# Patient Record
Sex: MALE | Race: WHITE | NOT HISPANIC OR LATINO | Employment: OTHER | URBAN - METROPOLITAN AREA
[De-identification: names, ages, dates, MRNs, and addresses within clinical notes are randomized per-mention and may not be internally consistent; named-entity substitution may affect disease eponyms.]

---

## 2018-07-22 ENCOUNTER — HOSPITAL ENCOUNTER (INPATIENT)
Facility: HOSPITAL | Age: 63
LOS: 3 days | DRG: 194 | End: 2018-07-26
Attending: EMERGENCY MEDICINE | Admitting: HOSPITALIST
Payer: MEDICARE

## 2018-07-22 DIAGNOSIS — J18.9 PNEUMONIA DUE TO INFECTIOUS ORGANISM, UNSPECIFIED LATERALITY, UNSPECIFIED PART OF LUNG: Primary | ICD-10-CM

## 2018-07-22 DIAGNOSIS — R50.9 ACUTE FEBRILE ILLNESS: ICD-10-CM

## 2018-07-22 DIAGNOSIS — I50.32 CHRONIC DIASTOLIC CONGESTIVE HEART FAILURE: Chronic | ICD-10-CM

## 2018-07-22 DIAGNOSIS — K57.92 DIVERTICULITIS: ICD-10-CM

## 2018-07-22 DIAGNOSIS — I50.9 CHF (CONGESTIVE HEART FAILURE): ICD-10-CM

## 2018-07-22 DIAGNOSIS — J44.1 CHRONIC OBSTRUCTIVE PULMONARY DISEASE WITH ACUTE EXACERBATION: Chronic | ICD-10-CM

## 2018-07-22 LAB
ALBUMIN SERPL BCP-MCNC: 2.8 G/DL
ALP SERPL-CCNC: 85 U/L
ALT SERPL W/O P-5'-P-CCNC: 37 U/L
ANION GAP SERPL CALC-SCNC: 10 MMOL/L
AST SERPL-CCNC: 51 U/L
BASOPHILS # BLD AUTO: 0.03 K/UL
BASOPHILS NFR BLD: 0.5 %
BILIRUB SERPL-MCNC: 0.8 MG/DL
BILIRUB UR QL STRIP: NEGATIVE
BUN SERPL-MCNC: 12 MG/DL
CALCIUM SERPL-MCNC: 8.8 MG/DL
CHLORIDE SERPL-SCNC: 102 MMOL/L
CLARITY UR: CLEAR
CO2 SERPL-SCNC: 26 MMOL/L
COLOR UR: YELLOW
CREAT SERPL-MCNC: 1.1 MG/DL
DIFFERENTIAL METHOD: ABNORMAL
EOSINOPHIL # BLD AUTO: 0.6 K/UL
EOSINOPHIL NFR BLD: 11.2 %
ERYTHROCYTE [DISTWIDTH] IN BLOOD BY AUTOMATED COUNT: 14.5 %
EST. GFR  (AFRICAN AMERICAN): >60 ML/MIN/1.73 M^2
EST. GFR  (NON AFRICAN AMERICAN): >60 ML/MIN/1.73 M^2
GLUCOSE SERPL-MCNC: 107 MG/DL
GLUCOSE UR QL STRIP: NEGATIVE
HCT VFR BLD AUTO: 43.4 %
HGB BLD-MCNC: 14.4 G/DL
HGB UR QL STRIP: NEGATIVE
KETONES UR QL STRIP: NEGATIVE
LEUKOCYTE ESTERASE UR QL STRIP: NEGATIVE
LIPASE SERPL-CCNC: 8 U/L
LYMPHOCYTES # BLD AUTO: 1.2 K/UL
LYMPHOCYTES NFR BLD: 20.7 %
MCH RBC QN AUTO: 33.2 PG
MCHC RBC AUTO-ENTMCNC: 33.2 G/DL
MCV RBC AUTO: 100 FL
MONOCYTES # BLD AUTO: 1.4 K/UL
MONOCYTES NFR BLD: 24.6 %
NEUTROPHILS # BLD AUTO: 2.4 K/UL
NEUTROPHILS NFR BLD: 41.9 %
NITRITE UR QL STRIP: NEGATIVE
PH UR STRIP: 6 [PH] (ref 5–8)
PLATELET # BLD AUTO: 190 K/UL
PMV BLD AUTO: 9.5 FL
POTASSIUM SERPL-SCNC: 3.5 MMOL/L
PROT SERPL-MCNC: 7 G/DL
PROT UR QL STRIP: NEGATIVE
RBC # BLD AUTO: 4.34 M/UL
SODIUM SERPL-SCNC: 138 MMOL/L
SP GR UR STRIP: 1.01 (ref 1–1.03)
URN SPEC COLLECT METH UR: NORMAL
UROBILINOGEN UR STRIP-ACNC: NEGATIVE EU/DL
WBC # BLD AUTO: 5.61 K/UL

## 2018-07-22 PROCEDURE — 85025 COMPLETE CBC W/AUTO DIFF WBC: CPT

## 2018-07-22 PROCEDURE — 25000003 PHARM REV CODE 250: Performed by: EMERGENCY MEDICINE

## 2018-07-22 PROCEDURE — 83036 HEMOGLOBIN GLYCOSYLATED A1C: CPT

## 2018-07-22 PROCEDURE — 25500020 PHARM REV CODE 255: Performed by: EMERGENCY MEDICINE

## 2018-07-22 PROCEDURE — 81003 URINALYSIS AUTO W/O SCOPE: CPT

## 2018-07-22 PROCEDURE — 80053 COMPREHEN METABOLIC PANEL: CPT

## 2018-07-22 PROCEDURE — 96361 HYDRATE IV INFUSION ADD-ON: CPT

## 2018-07-22 PROCEDURE — 99285 EMERGENCY DEPT VISIT HI MDM: CPT | Mod: 25

## 2018-07-22 PROCEDURE — 83690 ASSAY OF LIPASE: CPT

## 2018-07-22 RX ADMIN — SODIUM CHLORIDE 1000 ML: 0.9 INJECTION, SOLUTION INTRAVENOUS at 10:07

## 2018-07-22 RX ADMIN — IOHEXOL 100 ML: 350 INJECTION, SOLUTION INTRAVENOUS at 11:07

## 2018-07-23 PROBLEM — J18.9 PNEUMONIA DUE TO INFECTIOUS ORGANISM: Status: ACTIVE | Noted: 2018-07-23

## 2018-07-23 PROBLEM — J15.9 HEALTHCARE ASSOCIATED BACTERIAL PNEUMONIA: Status: ACTIVE | Noted: 2018-07-23

## 2018-07-23 PROBLEM — K21.9 GERD (GASTROESOPHAGEAL REFLUX DISEASE): Status: ACTIVE | Noted: 2018-07-23

## 2018-07-23 PROBLEM — J44.9 COPD (CHRONIC OBSTRUCTIVE PULMONARY DISEASE): Chronic | Status: ACTIVE | Noted: 2018-07-23

## 2018-07-23 PROBLEM — I50.9 CHF (CONGESTIVE HEART FAILURE): Status: ACTIVE | Noted: 2018-07-23

## 2018-07-23 PROBLEM — J44.9 COPD (CHRONIC OBSTRUCTIVE PULMONARY DISEASE): Status: ACTIVE | Noted: 2018-07-23

## 2018-07-23 LAB
ALBUMIN SERPL BCP-MCNC: 2.6 G/DL
ALP SERPL-CCNC: 77 U/L
ALT SERPL W/O P-5'-P-CCNC: 32 U/L
ANION GAP SERPL CALC-SCNC: 6 MMOL/L
AST SERPL-CCNC: 40 U/L
BASOPHILS # BLD AUTO: 0.02 K/UL
BASOPHILS NFR BLD: 0.4 %
BILIRUB SERPL-MCNC: 0.7 MG/DL
BUN SERPL-MCNC: 10 MG/DL
CALCIUM SERPL-MCNC: 8.1 MG/DL
CHLORIDE SERPL-SCNC: 103 MMOL/L
CO2 SERPL-SCNC: 29 MMOL/L
CREAT SERPL-MCNC: 1 MG/DL
DIFFERENTIAL METHOD: ABNORMAL
EOSINOPHIL # BLD AUTO: 0.6 K/UL
EOSINOPHIL NFR BLD: 11.6 %
ERYTHROCYTE [DISTWIDTH] IN BLOOD BY AUTOMATED COUNT: 14.7 %
EST. GFR  (AFRICAN AMERICAN): >60 ML/MIN/1.73 M^2
EST. GFR  (NON AFRICAN AMERICAN): >60 ML/MIN/1.73 M^2
ESTIMATED AVG GLUCOSE: 114 MG/DL
GLUCOSE SERPL-MCNC: 102 MG/DL
HBA1C MFR BLD HPLC: 5.6 %
HCT VFR BLD AUTO: 40.2 %
HGB BLD-MCNC: 13.2 G/DL
LYMPHOCYTES # BLD AUTO: 1 K/UL
LYMPHOCYTES NFR BLD: 18.9 %
MCH RBC QN AUTO: 33.2 PG
MCHC RBC AUTO-ENTMCNC: 32.8 G/DL
MCV RBC AUTO: 101 FL
MONOCYTES # BLD AUTO: 1.3 K/UL
MONOCYTES NFR BLD: 25.4 %
NEUTROPHILS # BLD AUTO: 2.2 K/UL
NEUTROPHILS NFR BLD: 42.5 %
PLATELET # BLD AUTO: 191 K/UL
PMV BLD AUTO: 9.4 FL
POTASSIUM SERPL-SCNC: 3.4 MMOL/L
PROT SERPL-MCNC: 6.3 G/DL
RBC # BLD AUTO: 3.97 M/UL
SODIUM SERPL-SCNC: 138 MMOL/L
WBC # BLD AUTO: 5.19 K/UL

## 2018-07-23 PROCEDURE — 63600175 PHARM REV CODE 636 W HCPCS: Performed by: PHYSICIAN ASSISTANT

## 2018-07-23 PROCEDURE — 80053 COMPREHEN METABOLIC PANEL: CPT

## 2018-07-23 PROCEDURE — 94644 CONT INHLJ TX 1ST HOUR: CPT

## 2018-07-23 PROCEDURE — 63600175 PHARM REV CODE 636 W HCPCS: Performed by: EMERGENCY MEDICINE

## 2018-07-23 PROCEDURE — 96367 TX/PROPH/DG ADDL SEQ IV INF: CPT

## 2018-07-23 PROCEDURE — 25000003 PHARM REV CODE 250: Performed by: EMERGENCY MEDICINE

## 2018-07-23 PROCEDURE — 85025 COMPLETE CBC W/AUTO DIFF WBC: CPT

## 2018-07-23 PROCEDURE — 96366 THER/PROPH/DIAG IV INF ADDON: CPT

## 2018-07-23 PROCEDURE — 96365 THER/PROPH/DIAG IV INF INIT: CPT

## 2018-07-23 PROCEDURE — 94640 AIRWAY INHALATION TREATMENT: CPT

## 2018-07-23 PROCEDURE — 87040 BLOOD CULTURE FOR BACTERIA: CPT | Mod: 59

## 2018-07-23 PROCEDURE — 94761 N-INVAS EAR/PLS OXIMETRY MLT: CPT

## 2018-07-23 PROCEDURE — 25000003 PHARM REV CODE 250: Performed by: PHYSICIAN ASSISTANT

## 2018-07-23 PROCEDURE — C8929 TTE W OR WO FOL WCON,DOPPLER: HCPCS

## 2018-07-23 PROCEDURE — 93306 TTE W/DOPPLER COMPLETE: CPT | Mod: 26,,, | Performed by: INTERNAL MEDICINE

## 2018-07-23 PROCEDURE — 25000242 PHARM REV CODE 250 ALT 637 W/ HCPCS: Performed by: PHYSICIAN ASSISTANT

## 2018-07-23 PROCEDURE — C9113 INJ PANTOPRAZOLE SODIUM, VIA: HCPCS | Performed by: EMERGENCY MEDICINE

## 2018-07-23 PROCEDURE — 11000001 HC ACUTE MED/SURG PRIVATE ROOM

## 2018-07-23 PROCEDURE — 25000242 PHARM REV CODE 250 ALT 637 W/ HCPCS

## 2018-07-23 PROCEDURE — 27000221 HC OXYGEN, UP TO 24 HOURS

## 2018-07-23 RX ORDER — L. ACIDOPHILUS/L.BULGARICUS 100MM CELL
1 GRANULES IN PACKET (EA) ORAL 2 TIMES DAILY
COMMUNITY

## 2018-07-23 RX ORDER — ALBUTEROL SULFATE 2.5 MG/.5ML
15 SOLUTION RESPIRATORY (INHALATION) ONCE
Status: COMPLETED | OUTPATIENT
Start: 2018-07-23 | End: 2018-07-23

## 2018-07-23 RX ORDER — TRAZODONE HYDROCHLORIDE 100 MG/1
75 TABLET ORAL NIGHTLY
COMMUNITY

## 2018-07-23 RX ORDER — IBUPROFEN 400 MG/1
400 TABLET ORAL EVERY 6 HOURS PRN
Status: DISCONTINUED | OUTPATIENT
Start: 2018-07-23 | End: 2018-07-26 | Stop reason: HOSPADM

## 2018-07-23 RX ORDER — FOLIC ACID 1 MG/1
1 TABLET ORAL DAILY
Status: DISCONTINUED | OUTPATIENT
Start: 2018-07-23 | End: 2018-07-26 | Stop reason: HOSPADM

## 2018-07-23 RX ORDER — PANTOPRAZOLE SODIUM 40 MG/1
40 TABLET, DELAYED RELEASE ORAL DAILY
COMMUNITY

## 2018-07-23 RX ORDER — GUAIFENESIN 600 MG/1
600 TABLET, EXTENDED RELEASE ORAL 2 TIMES DAILY
Status: DISCONTINUED | OUTPATIENT
Start: 2018-07-23 | End: 2018-07-26 | Stop reason: HOSPADM

## 2018-07-23 RX ORDER — LORATADINE 10 MG/1
10 TABLET ORAL DAILY
COMMUNITY

## 2018-07-23 RX ORDER — FUROSEMIDE 40 MG/1
40 TABLET ORAL 2 TIMES DAILY
Status: ON HOLD | COMMUNITY
End: 2018-07-25

## 2018-07-23 RX ORDER — FUROSEMIDE 40 MG/1
40 TABLET ORAL 2 TIMES DAILY
Status: DISCONTINUED | OUTPATIENT
Start: 2018-07-23 | End: 2018-07-23

## 2018-07-23 RX ORDER — IPRATROPIUM BROMIDE AND ALBUTEROL SULFATE 2.5; .5 MG/3ML; MG/3ML
3 SOLUTION RESPIRATORY (INHALATION) EVERY 4 HOURS
Status: DISCONTINUED | OUTPATIENT
Start: 2018-07-23 | End: 2018-07-26 | Stop reason: HOSPADM

## 2018-07-23 RX ORDER — CETIRIZINE HYDROCHLORIDE 5 MG/1
10 TABLET ORAL DAILY
Status: DISCONTINUED | OUTPATIENT
Start: 2018-07-23 | End: 2018-07-26 | Stop reason: HOSPADM

## 2018-07-23 RX ORDER — POTASSIUM CHLORIDE 20 MEQ/1
20 TABLET, EXTENDED RELEASE ORAL 2 TIMES DAILY
Status: DISCONTINUED | OUTPATIENT
Start: 2018-07-23 | End: 2018-07-24

## 2018-07-23 RX ORDER — FUROSEMIDE 10 MG/ML
60 INJECTION INTRAMUSCULAR; INTRAVENOUS 2 TIMES DAILY
Status: DISCONTINUED | OUTPATIENT
Start: 2018-07-23 | End: 2018-07-23

## 2018-07-23 RX ORDER — METHYLPREDNISOLONE SOD SUCC 125 MG
125 VIAL (EA) INJECTION EVERY 12 HOURS
Status: DISCONTINUED | OUTPATIENT
Start: 2018-07-23 | End: 2018-07-23

## 2018-07-23 RX ORDER — VANCOMYCIN HCL IN 5 % DEXTROSE 1G/250ML
1000 PLASTIC BAG, INJECTION (ML) INTRAVENOUS
Status: DISCONTINUED | OUTPATIENT
Start: 2018-07-23 | End: 2018-07-26

## 2018-07-23 RX ORDER — SODIUM CHLORIDE 9 MG/ML
INJECTION, SOLUTION INTRAVENOUS CONTINUOUS
Status: DISCONTINUED | OUTPATIENT
Start: 2018-07-23 | End: 2018-07-23

## 2018-07-23 RX ORDER — GUAIFENESIN 600 MG/1
600 TABLET, EXTENDED RELEASE ORAL 2 TIMES DAILY
COMMUNITY

## 2018-07-23 RX ORDER — ENOXAPARIN SODIUM 100 MG/ML
40 INJECTION SUBCUTANEOUS
COMMUNITY

## 2018-07-23 RX ORDER — THIAMINE HCL 100 MG
100 TABLET ORAL DAILY
Status: DISCONTINUED | OUTPATIENT
Start: 2018-07-23 | End: 2018-07-26 | Stop reason: HOSPADM

## 2018-07-23 RX ORDER — PANTOPRAZOLE SODIUM 40 MG/1
40 TABLET, DELAYED RELEASE ORAL DAILY
Status: DISCONTINUED | OUTPATIENT
Start: 2018-07-24 | End: 2018-07-26 | Stop reason: HOSPADM

## 2018-07-23 RX ORDER — FUROSEMIDE 10 MG/ML
60 INJECTION INTRAMUSCULAR; INTRAVENOUS 2 TIMES DAILY
Status: DISCONTINUED | OUTPATIENT
Start: 2018-07-23 | End: 2018-07-24

## 2018-07-23 RX ORDER — VANCOMYCIN HCL IN 5 % DEXTROSE 1G/250ML
1000 PLASTIC BAG, INJECTION (ML) INTRAVENOUS
Status: COMPLETED | OUTPATIENT
Start: 2018-07-23 | End: 2018-07-23

## 2018-07-23 RX ORDER — L. ACIDOPHILUS/L.BULGARICUS 100MM CELL
1 GRANULES IN PACKET (EA) ORAL 2 TIMES DAILY
Status: DISCONTINUED | OUTPATIENT
Start: 2018-07-23 | End: 2018-07-26 | Stop reason: HOSPADM

## 2018-07-23 RX ORDER — ALBUTEROL SULFATE 2.5 MG/.5ML
2.5 SOLUTION RESPIRATORY (INHALATION) EVERY 4 HOURS PRN
Status: DISCONTINUED | OUTPATIENT
Start: 2018-07-23 | End: 2018-07-23

## 2018-07-23 RX ORDER — POTASSIUM CHLORIDE 750 MG/1
20 TABLET, EXTENDED RELEASE ORAL 2 TIMES DAILY
Status: ON HOLD | COMMUNITY
End: 2018-07-25 | Stop reason: HOSPADM

## 2018-07-23 RX ORDER — ALBUTEROL SULFATE 2.5 MG/.5ML
SOLUTION RESPIRATORY (INHALATION)
Status: COMPLETED
Start: 2018-07-23 | End: 2018-07-23

## 2018-07-23 RX ORDER — FOLIC ACID 1 MG/1
1 TABLET ORAL DAILY
COMMUNITY

## 2018-07-23 RX ORDER — ESCITALOPRAM OXALATE 10 MG/1
15 TABLET ORAL DAILY
COMMUNITY

## 2018-07-23 RX ORDER — DOCUSATE SODIUM 100 MG/1
100 CAPSULE, LIQUID FILLED ORAL 2 TIMES DAILY
Status: DISCONTINUED | OUTPATIENT
Start: 2018-07-23 | End: 2018-07-23

## 2018-07-23 RX ORDER — IPRATROPIUM BROMIDE AND ALBUTEROL SULFATE 2.5; .5 MG/3ML; MG/3ML
3 SOLUTION RESPIRATORY (INHALATION) ONCE
Status: DISCONTINUED | OUTPATIENT
Start: 2018-07-23 | End: 2018-07-23

## 2018-07-23 RX ORDER — METHYLPREDNISOLONE SOD SUCC 125 MG
125 VIAL (EA) INJECTION EVERY 12 HOURS
Status: DISCONTINUED | OUTPATIENT
Start: 2018-07-23 | End: 2018-07-24

## 2018-07-23 RX ADMIN — LACTOBACILLUS ACIDOPHILUS / LACTOBACILLUS BULGARICUS 1 EACH: 100 MILLION CFU STRENGTH GRANULES at 10:07

## 2018-07-23 RX ADMIN — FUROSEMIDE 60 MG: 10 INJECTION, SOLUTION INTRAMUSCULAR; INTRAVENOUS at 12:07

## 2018-07-23 RX ADMIN — LACTOBACILLUS ACIDOPHILUS / LACTOBACILLUS BULGARICUS 1 EACH: 100 MILLION CFU STRENGTH GRANULES at 11:07

## 2018-07-23 RX ADMIN — TRAZODONE HYDROCHLORIDE 75 MG: 50 TABLET ORAL at 11:07

## 2018-07-23 RX ADMIN — PIPERACILLIN SODIUM AND TAZOBACTAM SODIUM 4.5 G: 4; .5 INJECTION, POWDER, FOR SOLUTION INTRAVENOUS at 02:07

## 2018-07-23 RX ADMIN — PIPERACILLIN SODIUM AND TAZOBACTAM SODIUM 4.5 G: 4; .5 INJECTION, POWDER, LYOPHILIZED, FOR SOLUTION INTRAVENOUS at 01:07

## 2018-07-23 RX ADMIN — IPRATROPIUM BROMIDE AND ALBUTEROL SULFATE 3 ML: .5; 3 SOLUTION RESPIRATORY (INHALATION) at 11:07

## 2018-07-23 RX ADMIN — IPRATROPIUM BROMIDE AND ALBUTEROL SULFATE 3 ML: .5; 3 SOLUTION RESPIRATORY (INHALATION) at 04:07

## 2018-07-23 RX ADMIN — ESCITALOPRAM 15 MG: 10 TABLET, FILM COATED ORAL at 11:07

## 2018-07-23 RX ADMIN — POTASSIUM CHLORIDE 20 MEQ: 1500 TABLET, EXTENDED RELEASE ORAL at 10:07

## 2018-07-23 RX ADMIN — VANCOMYCIN HYDROCHLORIDE 500 MG: 1 INJECTION, POWDER, LYOPHILIZED, FOR SOLUTION INTRAVENOUS at 04:07

## 2018-07-23 RX ADMIN — FOLIC ACID 1 MG: 1 TABLET ORAL at 11:07

## 2018-07-23 RX ADMIN — VANCOMYCIN HYDROCHLORIDE 1000 MG: 1 INJECTION, POWDER, LYOPHILIZED, FOR SOLUTION INTRAVENOUS at 05:07

## 2018-07-23 RX ADMIN — VANCOMYCIN HYDROCHLORIDE 1000 MG: 1 INJECTION, POWDER, LYOPHILIZED, FOR SOLUTION INTRAVENOUS at 02:07

## 2018-07-23 RX ADMIN — SODIUM CHLORIDE: 0.9 INJECTION, SOLUTION INTRAVENOUS at 03:07

## 2018-07-23 RX ADMIN — Medication 100 MG: at 11:07

## 2018-07-23 RX ADMIN — GUAIFENESIN 600 MG: 600 TABLET, EXTENDED RELEASE ORAL at 12:07

## 2018-07-23 RX ADMIN — POTASSIUM CHLORIDE 20 MEQ: 1500 TABLET, EXTENDED RELEASE ORAL at 11:07

## 2018-07-23 RX ADMIN — DOCUSATE SODIUM 100 MG: 100 CAPSULE, LIQUID FILLED ORAL at 11:07

## 2018-07-23 RX ADMIN — CETIRIZINE HYDROCHLORIDE 10 MG: 5 TABLET, FILM COATED ORAL at 11:07

## 2018-07-23 RX ADMIN — ALBUTEROL SULFATE 2.5 MG: 2.5 SOLUTION RESPIRATORY (INHALATION) at 05:07

## 2018-07-23 RX ADMIN — PIPERACILLIN SODIUM AND TAZOBACTAM SODIUM 4.5 G: 4; .5 INJECTION, POWDER, FOR SOLUTION INTRAVENOUS at 11:07

## 2018-07-23 RX ADMIN — GUAIFENESIN 600 MG: 600 TABLET, EXTENDED RELEASE ORAL at 10:07

## 2018-07-23 RX ADMIN — DEXTROSE 40 MG: 50 INJECTION, SOLUTION INTRAVENOUS at 10:07

## 2018-07-23 RX ADMIN — IPRATROPIUM BROMIDE AND ALBUTEROL SULFATE 3 ML: .5; 3 SOLUTION RESPIRATORY (INHALATION) at 08:07

## 2018-07-23 RX ADMIN — METHYLPREDNISOLONE SODIUM SUCCINATE 125 MG: 125 INJECTION, POWDER, FOR SOLUTION INTRAMUSCULAR; INTRAVENOUS at 05:07

## 2018-07-23 NOTE — ASSESSMENT & PLAN NOTE
Abdominal pain and tenderness present. CT showing stranding in LLQ.   CLD. Vanc and zosyn. Pain control. IVF as needed.

## 2018-07-23 NOTE — ED NOTES
Pt pulled out IV, pt up out of bed. Pt redirected back in bed and moved to a room closer to nurses station.

## 2018-07-23 NOTE — NURSING
Tiffanie Lewis notified of pt blood pressure of 95/51 with shortness of breath notified while laying in bed. Debbie stated she would come up tp the floor to see patient

## 2018-07-23 NOTE — PLAN OF CARE
Problem: Patient Care Overview  Goal: Plan of Care Review  Outcome: Ongoing (interventions implemented as appropriate)  Patient on oxygen with documented flow. Will attempt to wean per protocol.

## 2018-07-23 NOTE — ED TRIAGE NOTES
Patient presents to ED from [facility] by [facility] with c/o right lower abdominal pain for a few days. Pt is a voluntary admit to [facility] for depression. Pt also c/o nausea. Denies vomiting.

## 2018-07-23 NOTE — SUBJECTIVE & OBJECTIVE
Past Medical History:   Diagnosis Date    Abdominal hernia     Depression     Diverticulitis     History of psychiatric hospitalization     Hx of psychiatric care     Psychiatric problem     Suicide attempt     Therapy        Past Surgical History:   Procedure Laterality Date    COLON SURGERY      STOMACH SURGERY         Review of patient's allergies indicates:  No Known Allergies    No current facility-administered medications on file prior to encounter.      Current Outpatient Prescriptions on File Prior to Encounter   Medication Sig    docusate sodium (COLACE) 100 MG capsule Take 1 capsule (100 mg total) by mouth 3 (three) times daily as needed for Constipation. (Patient taking differently: Take 100 mg by mouth 2 (two) times daily. )    thiamine 100 MG tablet Take 1 tablet (100 mg total) by mouth once daily.    mirtazapine (REMERON) 45 MG tablet Take 1 tablet (45 mg total) by mouth every evening.    [DISCONTINUED] etodolac (LODINE) 400 MG tablet Take 1 tablet (400 mg total) by mouth 2 (two) times daily.    [DISCONTINUED] folic acid-vit B6-vit B12 2.5-25-2 mg (FOLBIC OR EQUIV) 2.5-25-2 mg Tab Take 1 tablet by mouth once daily.    [DISCONTINUED] gabapentin (NEURONTIN) 100 MG capsule Take 100 mg by mouth 3 (three) times daily.     Family History     Problem Relation (Age of Onset)    No Known Problems Mother, Father        Social History Main Topics    Smoking status: Current Every Day Smoker     Packs/day: 2.00     Years: 20.00     Types: Cigarettes    Smokeless tobacco: Never Used    Alcohol use 100.8 oz/week     168 Cans of beer per week      Comment: daily    Drug use: No    Sexual activity: Yes     Partners: Female     Review of Systems   Unable to perform ROS: Psychiatric disorder     Objective:     Vital Signs (Most Recent):  Temp: 100.3 °F (37.9 °C) (07/23/18 1244)  Pulse: 88 (07/23/18 1244)  Resp: (!) 22 (07/23/18 1141)  BP: 124/66 (07/23/18 1244)  SpO2: (!) 94 % (07/23/18 1141) Vital  Signs (24h Range):  Temp:  [96.4 °F (35.8 °C)-100.3 °F (37.9 °C)] 100.3 °F (37.9 °C)  Pulse:  [70-99] 88  Resp:  [20-30] 22  SpO2:  [94 %-100 %] 94 %  BP: ()/(39-88) 124/66     Weight: 93 kg (205 lb 0.4 oz)  Body mass index is 36.32 kg/m².    Physical Exam   Constitutional: He appears well-developed and well-nourished.   HENT:   Head: Normocephalic and atraumatic.   Neck: Normal range of motion.   Cardiovascular: Normal rate and regular rhythm.    Pulmonary/Chest: Tachypnea noted. He has wheezes. He has rales.   Musculoskeletal: He exhibits edema.   Neurological: He is alert.   Skin: Skin is warm and dry.   Psychiatric: His affect is blunt.           Significant Labs:   CBC:   Recent Labs  Lab 07/22/18 2309 07/23/18  0517   WBC 5.61 5.19   HGB 14.4 13.2*   HCT 43.4 40.2    191     CMP:   Recent Labs  Lab 07/22/18  2309 07/23/18  0517    138   K 3.5 3.4*    103   CO2 26 29    102   BUN 12 10   CREATININE 1.1 1.0   CALCIUM 8.8 8.1*   PROT 7.0 6.3   ALBUMIN 2.8* 2.6*   BILITOT 0.8 0.7   ALKPHOS 85 77   AST 51* 40   ALT 37 32   ANIONGAP 10 6*   EGFRNONAA >60 >60       Significant Imaging: I have reviewed all pertinent imaging results/findings within the past 24 hours.

## 2018-07-23 NOTE — ASSESSMENT & PLAN NOTE
Pt is voluntary admit at [facility] for depression and alcohol use.  Ativan PRN. Continue home lexapro, trazodone.

## 2018-07-23 NOTE — PLAN OF CARE
"TN met with pt -   pt able to answer questions with encouragement.    pt reports he resides at [facility] in [place] -- lived there prior to transfer to [facility] in [place]   TN called and confirmed wit Nurse Ange at [facility] - that pt is a resident and still has a bed.      TN asked pt if he wanted to return to [facility] at d/c - stated "oh, I guess so".       Pt states he is orignally from [place] -- no relatives in this area.  Is not in regular contact with his family.         07/23/18 1645   Discharge Reassessment   Assessment Type Discharge Planning Reassessment   Provided patient/caregiver education on the expected discharge date and the discharge plan Yes   Do you have any problems affording any of your prescribed medications? TBD   Discharge Plan A Psychiatric hospital  ([facility] )   Discharge Plan B Return to Nursing Home  ([facility] )   Patient choice form signed by patient/caregiver N/A   Can the patient answer the patient profile reliably? Yes, cognitively intact  (with encouragement - pt answer questions   )     "

## 2018-07-23 NOTE — ASSESSMENT & PLAN NOTE
Tobacco abuse  COPD (chronic obstructive pulmonary disease)  CHF (congestive heart failure)  Pt with SOB and hypoxia on room air. CXR showing coarse interstitial lung changes. CT chest showing some consolidation in the LLL.   Give vanc and zosyn. Duonebs q4h. Mucinex. Supplemental O2, wean as tolerated. Smoking cessation counseling. Lasix IV BID.

## 2018-07-23 NOTE — H&P
"[facility] Hospital Medicine  History & Physical    Patient Name: Nando Morrison  MRN: [id number]  Admission Date: 7/22/2018  Attending Physician: Tiffanie Lewis PA-C  Primary Care Provider: Primary Doctor No         Patient information was obtained from patient, nursing home, past medical records and ER records.     Subjective:     Principal Problem:Healthcare associated bacterial pneumonia    Chief Complaint:   Chief Complaint   Patient presents with    Abdominal Pain     x3 days; accompanied by fever and nausea; [facility]- voluntary admittance secondary to depression; last dose of tylenol given a "few hours ago" per EMS        HPI: Mr. Morrison is a 62 yo white male with severe depression, history of alcohol abuse, recurrent diverticulitis, COPD, GERD, and CHF who presents today with SOB and abdominal pain. He is a resident of [facility] and is a voluntary admit d/t depression and alcohol use. History is limited by pt's minimal expression. He reports that his stomach is hurting and he has trouble breathing. He was sent to the ED by the NH when his temp was found to be 101.6 and his mental status was altered. In the ED, CT abdomen showed diverticulitis. CXR showed coarse interstitial attenuation likely related to mild interstitial edema. He was started on vanc and zosyn. He was admitted to hospital medicine.     Past Medical History:   Diagnosis Date    Abdominal hernia     Depression     Diverticulitis     History of psychiatric hospitalization     Hx of psychiatric care     Psychiatric problem     Suicide attempt     Therapy        Past Surgical History:   Procedure Laterality Date    COLON SURGERY      STOMACH SURGERY         Review of patient's allergies indicates:  No Known Allergies    No current facility-administered medications on file prior to encounter.      Current Outpatient Prescriptions on File Prior to Encounter   Medication Sig    docusate " sodium (COLACE) 100 MG capsule Take 1 capsule (100 mg total) by mouth 3 (three) times daily as needed for Constipation. (Patient taking differently: Take 100 mg by mouth 2 (two) times daily. )    thiamine 100 MG tablet Take 1 tablet (100 mg total) by mouth once daily.    mirtazapine (REMERON) 45 MG tablet Take 1 tablet (45 mg total) by mouth every evening.    [DISCONTINUED] etodolac (LODINE) 400 MG tablet Take 1 tablet (400 mg total) by mouth 2 (two) times daily.    [DISCONTINUED] folic acid-vit B6-vit B12 2.5-25-2 mg (FOLBIC OR EQUIV) 2.5-25-2 mg Tab Take 1 tablet by mouth once daily.    [DISCONTINUED] gabapentin (NEURONTIN) 100 MG capsule Take 100 mg by mouth 3 (three) times daily.     Family History     Problem Relation (Age of Onset)    No Known Problems Mother, Father        Social History Main Topics    Smoking status: Current Every Day Smoker     Packs/day: 2.00     Years: 20.00     Types: Cigarettes    Smokeless tobacco: Never Used    Alcohol use 100.8 oz/week     168 Cans of beer per week      Comment: daily    Drug use: No    Sexual activity: Yes     Partners: Female     Review of Systems   Unable to perform ROS: Psychiatric disorder     Objective:     Vital Signs (Most Recent):  Temp: 100.3 °F (37.9 °C) (07/23/18 1244)  Pulse: 88 (07/23/18 1244)  Resp: (!) 22 (07/23/18 1141)  BP: 124/66 (07/23/18 1244)  SpO2: (!) 94 % (07/23/18 1141) Vital Signs (24h Range):  Temp:  [96.4 °F (35.8 °C)-100.3 °F (37.9 °C)] 100.3 °F (37.9 °C)  Pulse:  [70-99] 88  Resp:  [20-30] 22  SpO2:  [94 %-100 %] 94 %  BP: ()/(39-88) 124/66     Weight: 93 kg (205 lb 0.4 oz)  Body mass index is 36.32 kg/m².    Physical Exam   Constitutional: He appears well-developed and well-nourished.   HENT:   Head: Normocephalic and atraumatic.   Neck: Normal range of motion.   Cardiovascular: Normal rate and regular rhythm.    Pulmonary/Chest: Tachypnea noted. He has wheezes. He has rales.   Musculoskeletal: He exhibits edema.    Neurological: He is alert.   Skin: Skin is warm and dry.   Psychiatric: His affect is blunt.           Significant Labs:   CBC:   Recent Labs  Lab 07/22/18  2309 07/23/18  0517   WBC 5.61 5.19   HGB 14.4 13.2*   HCT 43.4 40.2    191     CMP:   Recent Labs  Lab 07/22/18  2309 07/23/18  0517    138   K 3.5 3.4*    103   CO2 26 29    102   BUN 12 10   CREATININE 1.1 1.0   CALCIUM 8.8 8.1*   PROT 7.0 6.3   ALBUMIN 2.8* 2.6*   BILITOT 0.8 0.7   ALKPHOS 85 77   AST 51* 40   ALT 37 32   ANIONGAP 10 6*   EGFRNONAA >60 >60       Significant Imaging: I have reviewed all pertinent imaging results/findings within the past 24 hours.    Assessment/Plan:     * Healthcare associated bacterial pneumonia    Tobacco abuse  COPD (chronic obstructive pulmonary disease)  CHF (congestive heart failure)  Pt with SOB and hypoxia on room air. CXR showing coarse interstitial lung changes. CT chest showing some consolidation in the LLL.   Give vanc and zosyn. Duonebs q4h. Mucinex. Supplemental O2, wean as tolerated. Smoking cessation counseling. Lasix IV BID.        Diverticulitis    Abdominal pain and tenderness present. CT showing stranding in LLQ.   CLD. Vanc and zosyn. Pain control. IVF as needed.        Depression    Pt is voluntary admit at [facility] for depression and alcohol use.  Ativan PRN. Continue home lexapro, trazodone.        GERD (gastroesophageal reflux disease)    Continue home protonix.           VTE Risk Mitigation         Ordered     IP VTE HIGH RISK PATIENT  Once      07/23/18 0203     Place ARELIS hose  Until discontinued      07/23/18 0203             Tiffanie Lewis PA-C  Department of Hospital Medicine   [facility]

## 2018-07-23 NOTE — HPI
Mr. Morrison is a 64 yo male with severe depression, history of alcohol abuse, recurrent diverticulitis, COPD, GERD, and diastolic CHF who presented to [facility] ED on 7/22/2018 with SOB and abdominal pain. He currently resides at [facility] voluntarily d/t depression and alcohol use. History is limited by pt's minimal expression. He reports that his stomach is hurting and he has trouble breathing. He was sent to the ED by the NH when his temp was found to be 101.6 and his mental status was altered. In the ED, CT abdomen showed diverticulitis. CXR showed coarse interstitial attenuation likely related to mild interstitial edema. He was started on vanc and zosyn. He was admitted to hospital medicine.

## 2018-07-23 NOTE — PLAN OF CARE
Problem: Patient Care Overview  Goal: Plan of Care Review  Outcome: Ongoing (interventions implemented as appropriate)  Pt awake and alert. Shortness of breath while sitting in bed. 2L NC with O2 sat above 93% Respiratory treatment every 4 hours. OOB to bathroom with walker and assistance. Voiding post lasix given.

## 2018-07-24 PROBLEM — R79.89 ELEVATED LACTIC ACID LEVEL: Status: ACTIVE | Noted: 2018-07-24

## 2018-07-24 PROBLEM — I50.32 CHRONIC DIASTOLIC CONGESTIVE HEART FAILURE: Status: ACTIVE | Noted: 2018-07-23

## 2018-07-24 PROBLEM — I50.32 CHRONIC DIASTOLIC CONGESTIVE HEART FAILURE: Chronic | Status: ACTIVE | Noted: 2018-07-23

## 2018-07-24 LAB
ALBUMIN SERPL BCP-MCNC: 2.9 G/DL
ALP SERPL-CCNC: 84 U/L
ALT SERPL W/O P-5'-P-CCNC: 36 U/L
ANION GAP SERPL CALC-SCNC: 12 MMOL/L
AST SERPL-CCNC: 40 U/L
BASOPHILS # BLD AUTO: 0.01 K/UL
BASOPHILS NFR BLD: 0.3 %
BILIRUB SERPL-MCNC: 0.5 MG/DL
BUN SERPL-MCNC: 12 MG/DL
CALCIUM SERPL-MCNC: 8.6 MG/DL
CHLORIDE SERPL-SCNC: 100 MMOL/L
CO2 SERPL-SCNC: 26 MMOL/L
CREAT SERPL-MCNC: 1.3 MG/DL
DIASTOLIC DYSFUNCTION: YES
DIFFERENTIAL METHOD: ABNORMAL
EOSINOPHIL # BLD AUTO: 0 K/UL
EOSINOPHIL NFR BLD: 0 %
ERYTHROCYTE [DISTWIDTH] IN BLOOD BY AUTOMATED COUNT: 14.5 %
EST. GFR  (AFRICAN AMERICAN): >60 ML/MIN/1.73 M^2
EST. GFR  (NON AFRICAN AMERICAN): 58 ML/MIN/1.73 M^2
ESTIMATED PA SYSTOLIC PRESSURE: 17.31
GLUCOSE SERPL-MCNC: 243 MG/DL
HCT VFR BLD AUTO: 42.8 %
HGB BLD-MCNC: 13.7 G/DL
LACTATE SERPL-SCNC: 3.3 MMOL/L
LACTATE SERPL-SCNC: 3.6 MMOL/L
LACTATE SERPL-SCNC: 4 MMOL/L
LACTATE SERPL-SCNC: 4.2 MMOL/L
LYMPHOCYTES # BLD AUTO: 0.5 K/UL
LYMPHOCYTES NFR BLD: 15.5 %
MCH RBC QN AUTO: 32.5 PG
MCHC RBC AUTO-ENTMCNC: 32 G/DL
MCV RBC AUTO: 101 FL
MITRAL VALVE MOBILITY: NORMAL
MONOCYTES # BLD AUTO: 0.3 K/UL
MONOCYTES NFR BLD: 7.9 %
NEUTROPHILS # BLD AUTO: 2.5 K/UL
NEUTROPHILS NFR BLD: 75.4 %
PLATELET # BLD AUTO: 224 K/UL
PMV BLD AUTO: 9.3 FL
POTASSIUM SERPL-SCNC: 3.7 MMOL/L
PROCALCITONIN SERPL IA-MCNC: 0.14 NG/ML
PROT SERPL-MCNC: 7.4 G/DL
RBC # BLD AUTO: 4.22 M/UL
RETIRED EF AND QEF - SEE NOTES: 55 (ref 55–65)
SODIUM SERPL-SCNC: 138 MMOL/L
TRICUSPID VALVE REGURGITATION: ABNORMAL
VANCOMYCIN TROUGH SERPL-MCNC: 16.9 UG/ML
WBC # BLD AUTO: 3.3 K/UL

## 2018-07-24 PROCEDURE — 63600175 PHARM REV CODE 636 W HCPCS: Performed by: EMERGENCY MEDICINE

## 2018-07-24 PROCEDURE — 25000003 PHARM REV CODE 250: Performed by: PHYSICIAN ASSISTANT

## 2018-07-24 PROCEDURE — 11000001 HC ACUTE MED/SURG PRIVATE ROOM

## 2018-07-24 PROCEDURE — G8979 MOBILITY GOAL STATUS: HCPCS | Mod: CK

## 2018-07-24 PROCEDURE — 63600175 PHARM REV CODE 636 W HCPCS: Performed by: PHYSICIAN ASSISTANT

## 2018-07-24 PROCEDURE — 94640 AIRWAY INHALATION TREATMENT: CPT

## 2018-07-24 PROCEDURE — 99900035 HC TECH TIME PER 15 MIN (STAT)

## 2018-07-24 PROCEDURE — 25000242 PHARM REV CODE 250 ALT 637 W/ HCPCS: Performed by: PHYSICIAN ASSISTANT

## 2018-07-24 PROCEDURE — 27000221 HC OXYGEN, UP TO 24 HOURS

## 2018-07-24 PROCEDURE — 36415 COLL VENOUS BLD VENIPUNCTURE: CPT

## 2018-07-24 PROCEDURE — 97530 THERAPEUTIC ACTIVITIES: CPT

## 2018-07-24 PROCEDURE — G8978 MOBILITY CURRENT STATUS: HCPCS | Mod: CK

## 2018-07-24 PROCEDURE — 97116 GAIT TRAINING THERAPY: CPT

## 2018-07-24 PROCEDURE — 80202 ASSAY OF VANCOMYCIN: CPT

## 2018-07-24 PROCEDURE — 27000646 HC AEROBIKA DEVICE

## 2018-07-24 PROCEDURE — 94664 DEMO&/EVAL PT USE INHALER: CPT

## 2018-07-24 PROCEDURE — 25000003 PHARM REV CODE 250: Performed by: EMERGENCY MEDICINE

## 2018-07-24 PROCEDURE — 94799 UNLISTED PULMONARY SVC/PX: CPT

## 2018-07-24 PROCEDURE — 97165 OT EVAL LOW COMPLEX 30 MIN: CPT

## 2018-07-24 PROCEDURE — G8988 SELF CARE GOAL STATUS: HCPCS | Mod: CI

## 2018-07-24 PROCEDURE — G8987 SELF CARE CURRENT STATUS: HCPCS | Mod: CK

## 2018-07-24 PROCEDURE — 84145 PROCALCITONIN (PCT): CPT

## 2018-07-24 PROCEDURE — 83605 ASSAY OF LACTIC ACID: CPT

## 2018-07-24 PROCEDURE — 97535 SELF CARE MNGMENT TRAINING: CPT

## 2018-07-24 PROCEDURE — 97161 PT EVAL LOW COMPLEX 20 MIN: CPT

## 2018-07-24 PROCEDURE — 80053 COMPREHEN METABOLIC PANEL: CPT

## 2018-07-24 PROCEDURE — 85025 COMPLETE CBC W/AUTO DIFF WBC: CPT

## 2018-07-24 PROCEDURE — 94761 N-INVAS EAR/PLS OXIMETRY MLT: CPT

## 2018-07-24 RX ORDER — ENOXAPARIN SODIUM 100 MG/ML
40 INJECTION SUBCUTANEOUS EVERY 24 HOURS
Status: DISCONTINUED | OUTPATIENT
Start: 2018-07-24 | End: 2018-07-26 | Stop reason: HOSPADM

## 2018-07-24 RX ORDER — FUROSEMIDE 10 MG/ML
60 INJECTION INTRAMUSCULAR; INTRAVENOUS 2 TIMES DAILY
Status: DISCONTINUED | OUTPATIENT
Start: 2018-07-25 | End: 2018-07-25

## 2018-07-24 RX ADMIN — PANTOPRAZOLE SODIUM 40 MG: 40 TABLET, DELAYED RELEASE ORAL at 09:07

## 2018-07-24 RX ADMIN — PIPERACILLIN SODIUM AND TAZOBACTAM SODIUM 4.5 G: 4; .5 INJECTION, POWDER, FOR SOLUTION INTRAVENOUS at 02:07

## 2018-07-24 RX ADMIN — IPRATROPIUM BROMIDE AND ALBUTEROL SULFATE 3 ML: .5; 3 SOLUTION RESPIRATORY (INHALATION) at 03:07

## 2018-07-24 RX ADMIN — IPRATROPIUM BROMIDE AND ALBUTEROL SULFATE 3 ML: .5; 3 SOLUTION RESPIRATORY (INHALATION) at 08:07

## 2018-07-24 RX ADMIN — GUAIFENESIN 600 MG: 600 TABLET, EXTENDED RELEASE ORAL at 09:07

## 2018-07-24 RX ADMIN — IPRATROPIUM BROMIDE AND ALBUTEROL SULFATE 3 ML: .5; 3 SOLUTION RESPIRATORY (INHALATION) at 11:07

## 2018-07-24 RX ADMIN — PIPERACILLIN SODIUM AND TAZOBACTAM SODIUM 4.5 G: 4; .5 INJECTION, POWDER, FOR SOLUTION INTRAVENOUS at 10:07

## 2018-07-24 RX ADMIN — IPRATROPIUM BROMIDE AND ALBUTEROL SULFATE 3 ML: .5; 3 SOLUTION RESPIRATORY (INHALATION) at 07:07

## 2018-07-24 RX ADMIN — VANCOMYCIN HYDROCHLORIDE 1000 MG: 1 INJECTION, POWDER, LYOPHILIZED, FOR SOLUTION INTRAVENOUS at 04:07

## 2018-07-24 RX ADMIN — POTASSIUM CHLORIDE 20 MEQ: 1500 TABLET, EXTENDED RELEASE ORAL at 09:07

## 2018-07-24 RX ADMIN — LACTOBACILLUS ACIDOPHILUS / LACTOBACILLUS BULGARICUS 1 EACH: 100 MILLION CFU STRENGTH GRANULES at 10:07

## 2018-07-24 RX ADMIN — METHYLPREDNISOLONE SODIUM SUCCINATE 125 MG: 125 INJECTION, POWDER, FOR SOLUTION INTRAMUSCULAR; INTRAVENOUS at 09:07

## 2018-07-24 RX ADMIN — IPRATROPIUM BROMIDE AND ALBUTEROL SULFATE 3 ML: .5; 3 SOLUTION RESPIRATORY (INHALATION) at 04:07

## 2018-07-24 RX ADMIN — LACTOBACILLUS ACIDOPHILUS / LACTOBACILLUS BULGARICUS 1 EACH: 100 MILLION CFU STRENGTH GRANULES at 09:07

## 2018-07-24 RX ADMIN — VANCOMYCIN HYDROCHLORIDE 1000 MG: 1 INJECTION, POWDER, LYOPHILIZED, FOR SOLUTION INTRAVENOUS at 05:07

## 2018-07-24 RX ADMIN — SODIUM CHLORIDE 500 ML: 0.9 INJECTION, SOLUTION INTRAVENOUS at 12:07

## 2018-07-24 RX ADMIN — SODIUM CHLORIDE 500 ML: 0.9 INJECTION, SOLUTION INTRAVENOUS at 04:07

## 2018-07-24 RX ADMIN — PIPERACILLIN SODIUM AND TAZOBACTAM SODIUM 4.5 G: 4; .5 INJECTION, POWDER, FOR SOLUTION INTRAVENOUS at 07:07

## 2018-07-24 RX ADMIN — ENOXAPARIN SODIUM 40 MG: 100 INJECTION SUBCUTANEOUS at 04:07

## 2018-07-24 RX ADMIN — FOLIC ACID 1 MG: 1 TABLET ORAL at 09:07

## 2018-07-24 RX ADMIN — TRAZODONE HYDROCHLORIDE 75 MG: 50 TABLET ORAL at 10:07

## 2018-07-24 RX ADMIN — IPRATROPIUM BROMIDE AND ALBUTEROL SULFATE 3 ML: .5; 3 SOLUTION RESPIRATORY (INHALATION) at 12:07

## 2018-07-24 RX ADMIN — CETIRIZINE HYDROCHLORIDE 10 MG: 5 TABLET, FILM COATED ORAL at 09:07

## 2018-07-24 RX ADMIN — GUAIFENESIN 600 MG: 600 TABLET, EXTENDED RELEASE ORAL at 10:07

## 2018-07-24 RX ADMIN — ESCITALOPRAM 15 MG: 10 TABLET, FILM COATED ORAL at 09:07

## 2018-07-24 RX ADMIN — Medication 100 MG: at 09:07

## 2018-07-24 NOTE — PROGRESS NOTES
[facility] Hospital Medicine  Progress Note    Patient Name: Nando Morrison  MRN: [id number]  Patient Class: IP- Inpatient   Admission Date: 7/22/2018  Length of Stay: 1 days  Attending Physician: Farhat Colvin MD  Primary Care Provider: Primary Doctor No        Subjective:     Principal Problem:Healthcare associated bacterial pneumonia    HPI:  Mr. Morrison is a 62 yo male with severe depression, history of alcohol abuse, recurrent diverticulitis, COPD, GERD, and diastolic CHF who presented to [facility] ED on 7/22/2018 with SOB and abdominal pain. He currently resides at [facility] voluntarily d/t depression and alcohol use. History is limited by pt's minimal expression. He reports that his stomach is hurting and he has trouble breathing. He was sent to the ED by the NH when his temp was found to be 101.6 and his mental status was altered. In the ED, CT abdomen showed diverticulitis. CXR showed coarse interstitial attenuation likely related to mild interstitial edema. He was started on vanc and zosyn. He was admitted to hospital medicine.     Hospital Course:  Hypoxia persists.  Repeat CXR 7/24: worsening LLL consolidation. Check lactic acid: 3.6 so giving IVF bolus.  Continue IV abx.  Advance diet as tolerating clear liquids.  Ordered PT/OT.     Interval History: Pt states still not feeling well. C/O shortness of breath, fatigue and mild abdominal pain. Denies CP, N/V.      Review of Systems   Constitutional: Positive for fatigue. Negative for chills and fever.   Respiratory: Positive for shortness of breath. Negative for cough.    Cardiovascular: Negative for chest pain and palpitations.   Gastrointestinal: Positive for abdominal pain. Negative for nausea and vomiting.   Neurological: Negative for headaches.   Psychiatric/Behavioral: Negative for confusion.     Objective:     Vital Signs (Most Recent):  Temp: 98.1 °F (36.7 °C) (07/24/18 0802)  Pulse: 86 (07/24/18 0802)  Resp:  18 (18 0802)  BP: (!) 101/59 (18 0802)  SpO2: (!) 94 % (18 0717) Vital Signs (24h Range):  Temp:  [98 °F (36.7 °C)-100.8 °F (38.2 °C)] 98.1 °F (36.7 °C)  Pulse:  [62-95] 86  Resp:  [14-24] 18  SpO2:  [94 %-97 %] 94 %  BP: ()/(50-66) 101/59     Weight: 96.8 kg (213 lb 6.5 oz)  Body mass index is 37.8 kg/m².    Intake/Output Summary (Last 24 hours) at 18 1016  Last data filed at 18 0545   Gross per 24 hour   Intake              350 ml   Output              725 ml   Net             -375 ml      Physical Exam   Constitutional: He is oriented to person, place, and time. No distress.   Morbidly obese   Cardiovascular: Normal rate and regular rhythm.    Pulmonary/Chest: Effort normal. No respiratory distress.   Using accessory muscles to breathe.  Diminished breath sounds throughout. No wheezing.    Abdominal: Soft. Bowel sounds are normal. He exhibits no distension.   Musculoskeletal: He exhibits no edema.   Neurological: He is alert and oriented to person, place, and time.   Psychiatric:   Flat affect. Calm and cooperative.    Nursing note and vitals reviewed.      Significant Labs:   CBC:   Recent Labs  Lab 18  0517 18  0601   WBC 5.61 5.19 3.30*   HGB 14.4 13.2* 13.7*   HCT 43.4 40.2 42.8    191 224     CMP:   Recent Labs  Lab 18  23018  0517 18  0601    138 138   K 3.5 3.4* 3.7    103 100   CO2 26 29 26    102 243*   BUN 12 10 12   CREATININE 1.1 1.0 1.3   CALCIUM 8.8 8.1* 8.6*   PROT 7.0 6.3 7.4   ALBUMIN 2.8* 2.6* 2.9*   BILITOT 0.8 0.7 0.5   ALKPHOS 85 77 84   AST 51* 40 40   ALT 37 32 36   ANIONGAP 10 6* 12   EGFRNONAA >60 >60 58*     Lactic Acid:   Recent Labs  Lab 18  0858   LACTATE 3.6*       Significant ImaginD echo with color flow doppler  Date of Procedure: 2018    TEST DESCRIPTION   Technical Quality: This is a technically challenging study. This study was performed in conjunction  with a 1ml intravenous injection of Optison contrast agent.     Aorta: The aortic root is normal in size, measuring 3.1 cm at sinotubular junction.     Left Atrium: The left atrial volume index is normal, measuring 21.44 cc/m2.     Left Ventricle: The left ventricle is normal in size. LV wall thickness is normal. There are no regional wall motion abnormalities. Left ventricular systolic function appears normal. Visually estimated ejection fraction is 55-60%. The LV Doppler derived   stroke volume equals 53.0 ccs.     Diastolic indices: E wave velocity 0.6 m/s, E/A ratio 0.9,  msec., E/e' ratio(lat) 8. Mean left atrial pressures are normal. There is diastolic dysfunction secondary to relaxation abnormality.     Right Atrium: The right atrium is normal in size, measuring 5.2 cm in length in the apical view.     Right Ventricle: The right ventricle is normal in size measuring 2.9 cm at the base in the apical right ventricle-focused view. Global right ventricular systolic function appears normal. The estimated PA systolic pressure is greater than 17 mmHg.     Aortic Valve:  The aortic valve is mildly sclerotic with normal leaflet mobility.     Mitral Valve:  The mitral valve is normal in structure with normal leaflet mobility.     Tricuspid Valve:  The tricuspid valve is mildly sclerotic with normal leaflet mobility. There is trivial tricuspid regurgitation.     Pulmonary Valve:  The pulmonic valve is not well seen.     IVC: The IVC is not visualized.     Intracavitary: There is no evidence of pericardial effusion, intracavity mass, thrombi, or vegetation.     CONCLUSIONS     1 - Normal left ventricular systolic function (EF 55-60%).     2 - Impaired LV relaxation, normal LAP (grade 1 diastolic dysfunction).     3 - Normal right ventricular systolic function .     4 - The estimated PA systolic pressure is greater than 17 mmHg.     This document has been electronically    SIGNED BY: Tom Marin MD On: 07/24/2018  07:50  X-Ray Chest 1 View  Narrative: EXAMINATION:  XR CHEST 1 VIEW    CLINICAL HISTORY:  pneumonia;    TECHNIQUE:  Single frontal view of the chest was performed.    COMPARISON:  07/22/2018    FINDINGS:  Monitoring leads overlie the chest.  The cardiomediastinal silhouette is stable.  The trachea is midline.  Lungs are symmetrically expanded with persistent coarse interstitial attenuation.  There is increased opacity involving the left lower lung zone with apparent blunting of the left costophrenic angle likely reflecting small volume of pleural fluid as well as underlying atelectatic/consolidative change.  No evidence of pneumothorax.  Visualized osseous structures are intact.  Impression: As above.    Electronically signed by: Brittaney Jaimes MD  Date:    07/24/2018  Time:    06:37        Assessment/Plan:      * Healthcare associated bacterial pneumonia    Elevated lactic acid  COPD (chronic obstructive pulmonary disease)  Pt still hypoxic on 3 liters O2. Continue vanc and zosyn, day #2.  Continue IV methylprednisolone x 1 more dose (contributing to hyperglycemia).  Elevated lactic acid 3.6. Giving 500 cc IVF bolus. Trend lactic acid every 4 hours x 2.  Check procalcitonin. Blood cultures NG x 2 days.  Adding IS and acapella.  Duonebs q4h. Mucinex. Supplemental O2, wean as tolerated.         Diverticulitis    Pt c/o only mild abdominal pain. No N/V.  Tolerating clear liquid diet. Advanced to soft, bland.  Continue zosyn and PRN analgesia.         Chronic diastolic congestive heart failure    Appears compensated. Holding lasix for slightly elevated creatinine. Likely does not need scheduled lasix at discharge with current EF 55-60% and only grade 1 diastolic dysfunction. On notes from [facility], states that he was taking for lower extremity edema. Pt denies any h/o CHF.  Monitor I&O and daily weight.           Depression    Alcohol abuse/dependence  Pt voluntarily admitted to [facility] for depression and alcohol use.  Completed librium taper. No evidence of DTs.  Continue home lexapro, trazodone.  Continue folic acid and thiamine. Ativan PRN. Ordered PT/OT.          GERD (gastroesophageal reflux disease)    Continue home protonix.         Anemia, macrocytic    Noted since 2015, likely 2/2 alcohol abuse. No visible, active bleeding. Monitor.           Tobacco abuse    Counseled; refused nicotine patch; provide info for Smoking Cessation Program.             VTE Risk Mitigation         Ordered     enoxaparin injection 40 mg  Daily      07/24/18 1020     IP VTE HIGH RISK PATIENT  Once      07/23/18 0203     Place ARELIS hose  Until discontinued      07/23/18 0203              Teresa Peña PA-C  Department of Hospital Medicine   [facility]  Pager: [phone]

## 2018-07-24 NOTE — PT/OT/SLP EVAL
Occupational Therapy   Evaluation & tx    Name: Nando Morrison  MRN: [id number]  Admitting Diagnosis:  Healthcare associated bacterial pneumonia      Recommendations:     Discharge Recommendations:    Discharge Equipment Recommendations:  none  Barriers to discharge:  None    History:     Occupational Profile:  Living Environment: NH prior to [facility]  Previous level of function: Mod (I) via SPC/RW  Roles and Routines: .  Equipment Owned:  cane, straight, walker, rolling  Assistance upon Discharge: 24hr S/A    Past Medical History:   Diagnosis Date    Abdominal hernia     Depression     Diverticulitis     History of psychiatric hospitalization     Hx of psychiatric care     Psychiatric problem     Suicide attempt     Therapy        Past Surgical History:   Procedure Laterality Date    COLON SURGERY      STOMACH SURGERY         Subjective     Chief Complaint: SOB  Patient/Family stated goals: return to PLOF  Communicated with: nsg prior to session.  Pain/Comfort:  · Pain Rating 1: 0/10  · Pain Rating Post-Intervention 1: 0/10    Patients cultural, spiritual, Christian conflicts given the current situation:      Objective:     Patient found with: oxygen, bed alarm, SCD    General Precautions: Standard, fall, respiratory   Orthopedic Precautions:N/A   Braces: N/A     Occupational Performance:    Bed Mobility:    · Patient completed Supine to Sit with stand by assistance    Functional Mobility/Transfers:  · Patient completed Sit <> Stand Transfer with contact guard assistance  with  rolling walker   · Patient completed Bed <> Chair Transfer using Step Transfer technique with stand by assistance with rolling walker  · Patient completed Toilet Transfer Step Transfer technique with contact guard assistance with  rolling walker and bedside commode  · Functional Mobility: via RW for short dist w/in room w/ CG-SBA    Activities of Daily Living:  · Toileting: stand by assistance and contact guard assistance on  "[facility]    Cognitive/Visual Perceptual:  AO4  Delayed processing  Flat affect    Physical Exam:  B UEs WFL at 4/5  **whole body tremors possibly 2/2 DTs    Sit balance: F+  Stand balance: F- to F    Patient left up in chair with all lines intact, call button in reach, chair alarm on and nsg notified    [facility] 6 Click:  AMPA Total Score: 17    Treatment & Education:  OT eval/tx completed this date. Pt currently resides at [facility] 2/2 depression & alcohol dependence, but lived at NH before that. States he interchanges amb w/ SPC & RW. PLOF: Mod (I) via amb DME. Currently, pt perf the following: sup-->EOB w/ SBA; standing, BSC & b/s chair t/f's via RW w/ CG-SBA; toileting on BSC w/ SBA. Pt left up in b/s chair at tx termination w/ chair alarm. Edu/tx re: HEP & deep/pursed lip breathing techs. Pt verbalized understanding.    Education:    Assessment:   Pt demo'ed SOB w/ all eval/tx standing tasks & ability to recover w/ sit down RB & pursed lip breathing. Pt presents w/ decreased overall endurance/conditioning, balance/mobiltiy & coordination w/ subsequent decline in (I)/safety w/ BADLs, fxnl mobility & fxnl t/f's. OT 5x/wk to increase phys/fxnl status & maximize potential to achieve established goals for d/c TBD pending progress.    Nando Morrison is a 63 y.o. male with a medical diagnosis of Healthcare associated bacterial pneumonia.  He presents with ..  Performance deficits affecting function are weakness, impaired endurance, gait instability, impaired functional mobilty, impaired self care skills, impaired balance, decreased lower extremity function, decreased coordination, decreased safety awareness, impaired cardiopulmonary response to activity.      Rehab Prognosis:  good; patient would benefit from acute skilled OT services to address these deficits and reach maximum level of function.         Clinical Decision Makin.  OT Low:  "Pt evaluation falls under low complexity for evaluation coding due to performance " "deficits noted in 1-3 areas as stated above and 0 co-morbities affecting current functional status. Data obtained from problem focused assessments. No modifications or assistance was required for completion of evaluation. Only brief occupational profile and history review completed."     Plan:     Patient to be seen 5 x/week to address the above listed problems via self-care/home management, therapeutic activities, therapeutic exercises  · Plan of Care Expires: 08/24/18  · Plan of Care Reviewed with: patient    This Plan of care has been discussed with the patient who was involved in its development and understands and is in agreement with the identified goals and treatment plan    GOALS:    Occupational Therapy Goals        Problem: Occupational Therapy Goal    Goal Priority Disciplines Outcome Interventions   Occupational Therapy Goal     OT, PT/OT Ongoing (interventions implemented as appropriate)    Description:  Goals to be met by: 08/24/18     Patient will increase functional independence with ADLs by performing:    LE Dressing with Supervision.  Grooming while standing at sink with Supervision.  Toileting from toilet with Supervision for hygiene and clothing management.   Toilet transfer to toilet with Supervision.  Increased functional strength to WFL for ADLs.                      Time Tracking:     OT Date of Treatment: 07/24/18  OT Start Time: 1057  OT Stop Time: 1135  OT Total Time (min): 38 min    Billable Minutes:Evaluation 10  Self Care/Home Management 13  Therapeutic Activity 15  Total Time 38    VIKTORIA White  7/24/2018    "

## 2018-07-24 NOTE — ASSESSMENT & PLAN NOTE
Pt c/o only mild abdominal pain. No N/V.  Tolerating clear liquid diet. Advanced to soft, bland.  Continue zosyn and PRN analgesia.

## 2018-07-24 NOTE — ASSESSMENT & PLAN NOTE
Elevated lactic acid  COPD (chronic obstructive pulmonary disease)  Pt still hypoxic on 3 liters O2. Continue vanc and zosyn, day #2.  Continue IV methylprednisolone x 1 more dose (contributing to hyperglycemia).  Elevated lactic acid 3.6. Giving 500 cc IVF bolus. Trend lactic acid every 4 hours x 2.  Check procalcitonin. Blood cultures NG x 2 days.  Adding IS and acapella.  Duonebs q4h. Mucinex. Supplemental O2, wean as tolerated.

## 2018-07-24 NOTE — PT/OT/SLP EVAL
Physical Therapy Evaluation    Patient Name:  Nando Morrison   MRN:  [id number]    Recommendations:     Discharge Recommendations:   (TBD)   Discharge Equipment Recommendations: none   Barriers to discharge: None    Assessment:     Nando Morrison is a 63 y.o. male admitted with a medical diagnosis of Healthcare associated bacterial pneumonia.  He presents with the following impairments/functional limitations:  weakness, impaired endurance, gait instability, impaired functional mobilty, impaired self care skills, impaired balance, impaired cardiopulmonary response to activity, decreased lower extremity function, decreased coordination Patient with increased SOB with exertion, SpO2 95% at rest with O2 at 2L; 93% on RA at rest; with exertion on RA, SpO2 88% recovering to 90-91% within 1-2 minutes; rest breaks between activities; post amb after sitting at EOB x 3-5 min, pt became trembly, c/o nausea and lightheadedness with B temporal HA - nurse informed- /56 HR 87 O2 sats 95% on 2L; will cont with POC; d/c recs pending progess..    Rehab Prognosis: good ; patient would benefit from acute skilled PT services to address these deficits and reach maximum level of function.      Recent Surgery: * No surgery found *      Plan:     During this hospitalization, patient to be seen 6 x/week to address the above listed problems via gait training, therapeutic activities, therapeutic exercises  · Plan of Care Expires:  08/24/18   Plan of Care Reviewed with: patient    Subjective     Communicated with nurse prior to session.  Patient found supine with HOB elevated upon PT entry to room, agreeable to evaluation.      Chief Complaint: weakness  Patient comments/goals: wants to get OOB to keep from getting weak  Pain/Comfort:  · Pain Rating 1: 0/10  · Pain Addressed 1: Nurse notified (post session-pt c/o HA - did not rate- c/o nausea, lightheadedness, trembling)  · Pain Rating Post-Intervention 1: 0/10    Patients cultural, spiritual,  Faith conflicts given the current situation: no    Living Environment:  Pt lived in [facility] prior to [facility]  Previous level of function: Mod (I) with std cane, RW   Equipment Owned:  cane, straight, walker, rolling  Assistance upon Discharge: 24hr S/A    Objective:     Patient found with: bed alarm, oxygen, SCD     General Precautions: Standard, fall, respiratory   Orthopedic Precautions:N/A   Braces: N/A     Exams:  · A&Ox4  · Follows one and two step commands; at times, delayed processing  · BLEs WFL for range; however, severe genu valgus during amb  · BLEs~4- to 4 grossly  · Pt denies paresthesias  · Skin integrity: intact, dry  · Noted whole body tremors    Functional Mobility:  · Bed Mobility:     · Supine to Sit: stand by assistance  · Sit to Supine: stand by assistance and with use of bed rail  · Transfers:     · Sit to Stand:  contact guard assistance with rolling walker  · Bed to Chair: stand by assistance and contact guard assistance with  rolling walker  using  Step Transfer  · Gait: Patient amb short distance in room using RW with SBA/CGA; severe genu valgus L, slow wilfrido, moderate forward trunk flexion with RW forward, with minimal trembling (ace wrapped L knee for support)  · Balance: Static/dynamic sit~fair+; static stand with RW~fair+; dynamic stand/amb~fair     AM-PAC 6 CLICK MOBILITY  Total Score:16       Therapeutic Activities and Exercises:   Patient educated in role of PT/POC; initial /58 HR 82 O2 sats 95% on 2L-refer to above vitals in assessment; pt instructed in pursed lip breathing 2/2 SOB with minimal exertion; performed bed mob, short distance amb with RW as above and instructed on BLE AROM ex; left pt in supine with HOB elevated then returned later, ace wrapped L knee for support; pt amb 70 ft using RW with 3 standing rests for pursed lip breathing 2/2 SOB; after sitting on EOB, noted increase in whole body trembling and pt c/o nausea and lightheadedness with B  temporal HA - nurse informed- /56 HR 87 O2 sats 95% on 2L    Patient left sitting at EOB with all lines intact, call button in reach and nurse present.    GOALS:    Physical Therapy Goals        Problem: Physical Therapy Goal    Goal Priority Disciplines Outcome Goal Variances Interventions   Physical Therapy Goal     PT/OT, PT Ongoing (interventions implemented as appropriate)     Description:  Goals to be met by: 2018     Patient will increase functional independence with mobility by performin. Supine to sit with supervision  2. Sit to supine with supervision  3. Sit to stand transfer with Supervision  4. Gait  x 80 feet with Supervision using Rolling Walker.   5. Lower extremity exercise program x10 reps with supervision                      History:     Past Medical History:   Diagnosis Date    Abdominal hernia     Depression     Diverticulitis     History of psychiatric hospitalization     Hx of psychiatric care     Psychiatric problem     Suicide attempt     Therapy        Past Surgical History:   Procedure Laterality Date    COLON SURGERY      STOMACH SURGERY         Clinical Decision Making:     History  Co-morbidities and personal factors that may impact the plan of care Examination  Body Structures and Functions, activity limitations and participation restrictions that may impact the plan of care Clinical Presentation   Decision Making/ Complexity Score   Co-morbidities:   [] Time since onset of injury / illness / exacerbation  [x] Status of current condition  []Patient's cognitive status and safety concerns    [] Multiple Medical Problems (see med hx)  Personal Factors:   [] Patient's age  [] Prior Level of function   [] Patient's home situation (environment and family support)  [] Patient's level of motivation  [] Expected progression of patient      HISTORY:(criteria)    [] 00766 - no personal factors/history    [x] 06060 - has 1-2 personal factor/comorbidity     [] 00050 -  has >3 personal factor/comorbidity     Body Regions:  [x] Objective examination findings  [] Head     []  Neck  [] Trunk   [] Upper Extremity  [] Lower Extremity    Body Systems:  [x] For communication ability, affect, cognition, language, and learning style: the assessment of the ability to make needs known, consciousness, orientation (person, place, and time), expected emotional /behavioral responses, and learning preferences (eg, learning barriers, education  needs)  [x] For the neuromuscular system: a general assessment of gross coordinated movement (eg, balance, gait, locomotion, transfers, and transitions) and motor function  (motor control and motor learning)  [x] For the musculoskeletal system: the assessment of gross symmetry, gross range of motion, gross strength, height, and weight  [x] For the integumentary system: the assessment of pliability(texture), presence of scar formation, skin color, and skin integrity  [x] For cardiovascular/pulmonary system: the assessment of heart rate, respiratory rate, blood pressure, and edema     Activity limitations:    [] Patient's cognitive status and saf ety concerns          [x] Status of current condition      [] Weight bearing restriction  [] Cardiopulmunary Restriction    Participation Restrictions:   [] Goals and goal agreement with the patient     [] Rehab potential (prognosis) and probable outcome      Examination of Body System: (criteria)    [] 02203 - addressing 1-2 elements    [] 26914 - addressing a total of 3 or more elements     [x] 99794 -  Addressing a total of 4 or more elements         Clinical Presentation: (criteria)  Stable - 47818     On examination of body system using standardized tests and measures patient presents with 4 or more elements from any of the following: body structures and functions, activity limitations, and/or participation restrictions.  Leading to a clinical presentation that is considered stable and/or  uncomplicated                              Clinical Decision Making  (Eval Complexity):  Low- 15497     Time Tracking:     PT Received On: 07/24/18  PT Start Time: 1620 (1453)     PT Stop Time: 1640 (1532)  PT Total Time (min): 20 min + 39 minutes=59 minutes    Billable Minutes: Evaluation 20 minutes, Gait Training 15 minutes, Therapeutic Activity 24 minutes minutes      Consuelo Jon, PT  07/24/2018

## 2018-07-24 NOTE — HOSPITAL COURSE
Hypoxia improved.  Repeat CXR 7/24: worsening LLL consolidation.Blood culture NG x 4 days.  Elevated lactic acid: 3.6 > 4.2  so given IVF with improvement to 1.8.  Received IV zosyn and vancomycin x 4 days. Discharging on oral Augmentin to complete 7 day course of treatment. Tolerating diet.  Worked with PT/OT with noted significant debility requiring additional therapy (Part B therapy). Pt to return to [facility].

## 2018-07-24 NOTE — PLAN OF CARE
Problem: Patient Care Overview  Goal: Plan of Care Review  Outcome: Ongoing (interventions implemented as appropriate)  Pt awake and alert. Slight shortness of breath noted at rest. 2L nc with breathing breathing treatment. 1L bolus given per shift for lactic acid level.

## 2018-07-24 NOTE — NURSING
Pt. Aroused to mild tactile stimulation.  Pt. Can state first name only and states he doesn't know where he is. Paged MD Zaragoza to provide present orientation and status of pt.

## 2018-07-24 NOTE — PLAN OF CARE
Problem: Patient Care Overview  Goal: Plan of Care Review  Outcome: Ongoing (interventions implemented as appropriate)  The proper method of use, as well as anticipated side effects, of this aerosol treatment are discussed and demonstrated to the patient.   Patient on oxygen with documented flow.  Will attempt to wean per O2 order protocol.  Will continue to monitor.

## 2018-07-24 NOTE — NURSING
Spoke with MD Zaragoza, pt. To continue resp.tx.and methylprednisone, continue monitoring pt. Throughout shift. Current oxygen sats 94% ON 3L.

## 2018-07-24 NOTE — ASSESSMENT & PLAN NOTE
Alcohol abuse/dependence  Pt voluntarily admitted to [facility] for depression and alcohol use. Completed librium taper. No evidence of DTs.  Continue home lexapro, trazodone.  Continue folic acid and thiamine. Ativan PRN. Ordered PT/OT.

## 2018-07-24 NOTE — PLAN OF CARE
Problem: Patient Care Overview  Goal: Plan of Care Review  Pt received on documented flow, will cont to monitor.

## 2018-07-24 NOTE — PLAN OF CARE
Problem: Physical Therapy Goal  Goal: Physical Therapy Goal  Goals to be met by: 2018     Patient will increase functional independence with mobility by performin. Supine to sit with supervision  2. Sit to supine with supervision  3. Sit to stand transfer with Supervision  4. Gait  x 80 feet with Supervision using Rolling Walker.   5. Lower extremity exercise program x10 reps with supervision    Outcome: Ongoing (interventions implemented as appropriate)  Patient with increased SOB with exertion, SpO2 95% at rest with O2 at 2L; 93% on RA at rest; with exertion on RA, SpO2 88% recovering to 90-91% within 1-2 minutes; rest breaks between activities; post amb after sitting at EOB x 3-5 min, pt became trembly, c/o nausea and lightheadedness with B temporal HA - nurse informed- /56 HR 87 O2 sats 95% on 2L; will cont with POC; d/c recs pending progess.

## 2018-07-24 NOTE — PLAN OF CARE
Problem: Occupational Therapy Goal  Goal: Occupational Therapy Goal  Goals to be met by: 08/24/18     Patient will increase functional independence with ADLs by performing:    LE Dressing with Supervision.  Grooming while standing at sink with Supervision.  Toileting from toilet with Supervision for hygiene and clothing management.   Toilet transfer to toilet with Supervision.  Increased functional strength to WFL for ADLs.    Outcome: Ongoing (interventions implemented as appropriate)  OT eval/tx completed this date. Pt currently resides at [facility] 2/2 depression & alcohol dependence, but lived at [facility] before that. States he interchanges amb w/ SPC & RW. PLOF: Mod (I) via amb DME. Currently, pt perf the following: sup-->EOB w/ SBA; standing, BSC & b/s chair t/f's via RW w/ CG-SBA; toileting on BSC w/ SBA. Pt left up in b/s chair at tx termination w/ chair alarm. Edu/tx re: HEP & deep/pursed lip breathing techs. Pt verbalized understanding.    Pt demo'ed SOB w/ all eval/tx standing tasks & ability to recover w/ sit down RB & pursed lip breathing. Pt presents w/ decreased overall endurance/conditioning, balance/mobiltiy & coordination w/ subsequent decline in (I)/safety w/ BADLs, fxnl mobility & fxnl t/f's. OT 5x/wk to increase phys/fxnl status & maximize potential to achieve established goals for d/c TBD pending progress.

## 2018-07-24 NOTE — ASSESSMENT & PLAN NOTE
Appears compensated. Holding lasix for slightly elevated creatinine. Likely does not need scheduled lasix at discharge with current EF 55-60% and only grade 1 diastolic dysfunction. On notes from [facility], states that he was taking for lower extremity edema. Pt denies any h/o CHF.  Monitor I&O and daily weight.

## 2018-07-24 NOTE — SUBJECTIVE & OBJECTIVE
Interval History: Pt states still not feeling well. C/O shortness of breath, fatigue and mild abdominal pain. Denies CP, N/V.      Review of Systems   Constitutional: Positive for fatigue. Negative for chills and fever.   Respiratory: Positive for shortness of breath. Negative for cough.    Cardiovascular: Negative for chest pain and palpitations.   Gastrointestinal: Positive for abdominal pain. Negative for nausea and vomiting.   Neurological: Negative for headaches.   Psychiatric/Behavioral: Negative for confusion.     Objective:     Vital Signs (Most Recent):  Temp: 98.1 °F (36.7 °C) (07/24/18 0802)  Pulse: 86 (07/24/18 0802)  Resp: 18 (07/24/18 0802)  BP: (!) 101/59 (07/24/18 0802)  SpO2: (!) 94 % (07/24/18 0717) Vital Signs (24h Range):  Temp:  [98 °F (36.7 °C)-100.8 °F (38.2 °C)] 98.1 °F (36.7 °C)  Pulse:  [62-95] 86  Resp:  [14-24] 18  SpO2:  [94 %-97 %] 94 %  BP: ()/(50-66) 101/59     Weight: 96.8 kg (213 lb 6.5 oz)  Body mass index is 37.8 kg/m².    Intake/Output Summary (Last 24 hours) at 07/24/18 1016  Last data filed at 07/24/18 0545   Gross per 24 hour   Intake              350 ml   Output              725 ml   Net             -375 ml      Physical Exam   Constitutional: He is oriented to person, place, and time. No distress.   Morbidly obese   Cardiovascular: Normal rate and regular rhythm.    Pulmonary/Chest: Effort normal. No respiratory distress.   Using accessory muscles to breathe.  Diminished breath sounds throughout. No wheezing.    Abdominal: Soft. Bowel sounds are normal. He exhibits no distension.   Musculoskeletal: He exhibits no edema.   Neurological: He is alert and oriented to person, place, and time.   Psychiatric:   Flat affect. Calm and cooperative.    Nursing note and vitals reviewed.      Significant Labs:   CBC:   Recent Labs  Lab 07/22/18  2309 07/23/18  0517 07/24/18  0601   WBC 5.61 5.19 3.30*   HGB 14.4 13.2* 13.7*   HCT 43.4 40.2 42.8    191 224     CMP:   Recent  Labs  Lab 18  2309 18  0517 18  0601    138 138   K 3.5 3.4* 3.7    103 100   CO2 26 29 26    102 243*   BUN 12 10 12   CREATININE 1.1 1.0 1.3   CALCIUM 8.8 8.1* 8.6*   PROT 7.0 6.3 7.4   ALBUMIN 2.8* 2.6* 2.9*   BILITOT 0.8 0.7 0.5   ALKPHOS 85 77 84   AST 51* 40 40   ALT 37 32 36   ANIONGAP 10 6* 12   EGFRNONAA >60 >60 58*     Lactic Acid:   Recent Labs  Lab 18  0858   LACTATE 3.6*       Significant ImaginD echo with color flow doppler  Date of Procedure: 2018    TEST DESCRIPTION   Technical Quality: This is a technically challenging study. This study was performed in conjunction with a 1ml intravenous injection of Optison contrast agent.     Aorta: The aortic root is normal in size, measuring 3.1 cm at sinotubular junction.     Left Atrium: The left atrial volume index is normal, measuring 21.44 cc/m2.     Left Ventricle: The left ventricle is normal in size. LV wall thickness is normal. There are no regional wall motion abnormalities. Left ventricular systolic function appears normal. Visually estimated ejection fraction is 55-60%. The LV Doppler derived   stroke volume equals 53.0 ccs.     Diastolic indices: E wave velocity 0.6 m/s, E/A ratio 0.9,  msec., E/e' ratio(lat) 8. Mean left atrial pressures are normal. There is diastolic dysfunction secondary to relaxation abnormality.     Right Atrium: The right atrium is normal in size, measuring 5.2 cm in length in the apical view.     Right Ventricle: The right ventricle is normal in size measuring 2.9 cm at the base in the apical right ventricle-focused view. Global right ventricular systolic function appears normal. The estimated PA systolic pressure is greater than 17 mmHg.     Aortic Valve:  The aortic valve is mildly sclerotic with normal leaflet mobility.     Mitral Valve:  The mitral valve is normal in structure with normal leaflet mobility.     Tricuspid Valve:  The tricuspid valve is mildly  sclerotic with normal leaflet mobility. There is trivial tricuspid regurgitation.     Pulmonary Valve:  The pulmonic valve is not well seen.     IVC: The IVC is not visualized.     Intracavitary: There is no evidence of pericardial effusion, intracavity mass, thrombi, or vegetation.     CONCLUSIONS     1 - Normal left ventricular systolic function (EF 55-60%).     2 - Impaired LV relaxation, normal LAP (grade 1 diastolic dysfunction).     3 - Normal right ventricular systolic function .     4 - The estimated PA systolic pressure is greater than 17 mmHg.     This document has been electronically    SIGNED BY: Tom Marin MD On: 07/24/2018 07:50  X-Ray Chest 1 View  Narrative: EXAMINATION:  XR CHEST 1 VIEW    CLINICAL HISTORY:  pneumonia;    TECHNIQUE:  Single frontal view of the chest was performed.    COMPARISON:  07/22/2018    FINDINGS:  Monitoring leads overlie the chest.  The cardiomediastinal silhouette is stable.  The trachea is midline.  Lungs are symmetrically expanded with persistent coarse interstitial attenuation.  There is increased opacity involving the left lower lung zone with apparent blunting of the left costophrenic angle likely reflecting small volume of pleural fluid as well as underlying atelectatic/consolidative change.  No evidence of pneumothorax.  Visualized osseous structures are intact.  Impression: As above.    Electronically signed by: Brittaney Jaimes MD  Date:    07/24/2018  Time:    06:37

## 2018-07-25 PROBLEM — R79.89 ELEVATED LACTIC ACID LEVEL: Status: RESOLVED | Noted: 2018-07-24 | Resolved: 2018-07-25

## 2018-07-25 LAB
ANION GAP SERPL CALC-SCNC: 9 MMOL/L
BASOPHILS # BLD AUTO: 0 K/UL
BASOPHILS NFR BLD: 0 %
BUN SERPL-MCNC: 11 MG/DL
CALCIUM SERPL-MCNC: 8.4 MG/DL
CHLORIDE SERPL-SCNC: 104 MMOL/L
CO2 SERPL-SCNC: 28 MMOL/L
CREAT SERPL-MCNC: 1.1 MG/DL
DIFFERENTIAL METHOD: ABNORMAL
EOSINOPHIL # BLD AUTO: 0 K/UL
EOSINOPHIL NFR BLD: 0 %
ERYTHROCYTE [DISTWIDTH] IN BLOOD BY AUTOMATED COUNT: 14.8 %
EST. GFR  (AFRICAN AMERICAN): >60 ML/MIN/1.73 M^2
EST. GFR  (NON AFRICAN AMERICAN): >60 ML/MIN/1.73 M^2
GLUCOSE SERPL-MCNC: 139 MG/DL
HCT VFR BLD AUTO: 41.6 %
HGB BLD-MCNC: 13.2 G/DL
LACTATE SERPL-SCNC: 1.8 MMOL/L
LYMPHOCYTES # BLD AUTO: 0.6 K/UL
LYMPHOCYTES NFR BLD: 9.1 %
MAGNESIUM SERPL-MCNC: 1.7 MG/DL
MCH RBC QN AUTO: 32.5 PG
MCHC RBC AUTO-ENTMCNC: 31.7 G/DL
MCV RBC AUTO: 103 FL
MONOCYTES # BLD AUTO: 1 K/UL
MONOCYTES NFR BLD: 14.8 %
NEUTROPHILS # BLD AUTO: 5.2 K/UL
NEUTROPHILS NFR BLD: 75.5 %
PHOSPHATE SERPL-MCNC: 3.3 MG/DL
PLATELET # BLD AUTO: 288 K/UL
PMV BLD AUTO: 9.2 FL
POTASSIUM SERPL-SCNC: 3.7 MMOL/L
RBC # BLD AUTO: 4.06 M/UL
SODIUM SERPL-SCNC: 141 MMOL/L
WBC # BLD AUTO: 6.89 K/UL

## 2018-07-25 PROCEDURE — 94761 N-INVAS EAR/PLS OXIMETRY MLT: CPT

## 2018-07-25 PROCEDURE — 63600175 PHARM REV CODE 636 W HCPCS: Performed by: PHYSICIAN ASSISTANT

## 2018-07-25 PROCEDURE — 80048 BASIC METABOLIC PNL TOTAL CA: CPT

## 2018-07-25 PROCEDURE — 85025 COMPLETE CBC W/AUTO DIFF WBC: CPT

## 2018-07-25 PROCEDURE — 27000221 HC OXYGEN, UP TO 24 HOURS

## 2018-07-25 PROCEDURE — 94799 UNLISTED PULMONARY SVC/PX: CPT

## 2018-07-25 PROCEDURE — 63600175 PHARM REV CODE 636 W HCPCS: Performed by: EMERGENCY MEDICINE

## 2018-07-25 PROCEDURE — 83735 ASSAY OF MAGNESIUM: CPT

## 2018-07-25 PROCEDURE — 97116 GAIT TRAINING THERAPY: CPT

## 2018-07-25 PROCEDURE — 25000003 PHARM REV CODE 250: Performed by: EMERGENCY MEDICINE

## 2018-07-25 PROCEDURE — 97110 THERAPEUTIC EXERCISES: CPT

## 2018-07-25 PROCEDURE — 25000003 PHARM REV CODE 250: Performed by: PHYSICIAN ASSISTANT

## 2018-07-25 PROCEDURE — 99900035 HC TECH TIME PER 15 MIN (STAT)

## 2018-07-25 PROCEDURE — 36415 COLL VENOUS BLD VENIPUNCTURE: CPT

## 2018-07-25 PROCEDURE — 94640 AIRWAY INHALATION TREATMENT: CPT

## 2018-07-25 PROCEDURE — 83605 ASSAY OF LACTIC ACID: CPT

## 2018-07-25 PROCEDURE — 11000001 HC ACUTE MED/SURG PRIVATE ROOM

## 2018-07-25 PROCEDURE — 25000242 PHARM REV CODE 250 ALT 637 W/ HCPCS: Performed by: PHYSICIAN ASSISTANT

## 2018-07-25 PROCEDURE — 94664 DEMO&/EVAL PT USE INHALER: CPT

## 2018-07-25 PROCEDURE — 84100 ASSAY OF PHOSPHORUS: CPT

## 2018-07-25 PROCEDURE — 97535 SELF CARE MNGMENT TRAINING: CPT

## 2018-07-25 RX ORDER — PREDNISONE 20 MG/1
40 TABLET ORAL DAILY
Status: DISCONTINUED | OUTPATIENT
Start: 2018-07-25 | End: 2018-07-26 | Stop reason: HOSPADM

## 2018-07-25 RX ORDER — FUROSEMIDE 40 MG/1
40 TABLET ORAL DAILY PRN
Start: 2018-07-25

## 2018-07-25 RX ORDER — PREDNISONE 20 MG/1
40 TABLET ORAL DAILY
Qty: 8 TABLET | Refills: 0
Start: 2018-07-27 | End: 2018-07-31

## 2018-07-25 RX ORDER — AMOXICILLIN AND CLAVULANATE POTASSIUM 875; 125 MG/1; MG/1
1 TABLET, FILM COATED ORAL EVERY 12 HOURS
Qty: 8 TABLET | Refills: 0
Start: 2018-07-26 | End: 2018-07-30

## 2018-07-25 RX ORDER — IPRATROPIUM BROMIDE AND ALBUTEROL SULFATE 2.5; .5 MG/3ML; MG/3ML
3 SOLUTION RESPIRATORY (INHALATION) EVERY 4 HOURS
Qty: 1 BOX | Refills: 0
Start: 2018-07-25 | End: 2019-07-25

## 2018-07-25 RX ORDER — DOCUSATE SODIUM 100 MG/1
100 CAPSULE, LIQUID FILLED ORAL 2 TIMES DAILY
Start: 2018-07-25

## 2018-07-25 RX ADMIN — Medication 100 MG: at 10:07

## 2018-07-25 RX ADMIN — GUAIFENESIN 600 MG: 600 TABLET, EXTENDED RELEASE ORAL at 10:07

## 2018-07-25 RX ADMIN — VANCOMYCIN HYDROCHLORIDE 1000 MG: 1 INJECTION, POWDER, LYOPHILIZED, FOR SOLUTION INTRAVENOUS at 05:07

## 2018-07-25 RX ADMIN — CETIRIZINE HYDROCHLORIDE 10 MG: 5 TABLET, FILM COATED ORAL at 10:07

## 2018-07-25 RX ADMIN — LACTOBACILLUS ACIDOPHILUS / LACTOBACILLUS BULGARICUS 1 EACH: 100 MILLION CFU STRENGTH GRANULES at 10:07

## 2018-07-25 RX ADMIN — PREDNISONE 40 MG: 20 TABLET ORAL at 10:07

## 2018-07-25 RX ADMIN — ENOXAPARIN SODIUM 40 MG: 100 INJECTION SUBCUTANEOUS at 05:07

## 2018-07-25 RX ADMIN — PANTOPRAZOLE SODIUM 40 MG: 40 TABLET, DELAYED RELEASE ORAL at 10:07

## 2018-07-25 RX ADMIN — IPRATROPIUM BROMIDE AND ALBUTEROL SULFATE 3 ML: .5; 3 SOLUTION RESPIRATORY (INHALATION) at 12:07

## 2018-07-25 RX ADMIN — PIPERACILLIN SODIUM AND TAZOBACTAM SODIUM 4.5 G: 4; .5 INJECTION, POWDER, FOR SOLUTION INTRAVENOUS at 05:07

## 2018-07-25 RX ADMIN — PIPERACILLIN SODIUM AND TAZOBACTAM SODIUM 4.5 G: 4; .5 INJECTION, POWDER, FOR SOLUTION INTRAVENOUS at 02:07

## 2018-07-25 RX ADMIN — FOLIC ACID 1 MG: 1 TABLET ORAL at 10:07

## 2018-07-25 RX ADMIN — IPRATROPIUM BROMIDE AND ALBUTEROL SULFATE 3 ML: .5; 3 SOLUTION RESPIRATORY (INHALATION) at 07:07

## 2018-07-25 RX ADMIN — ESCITALOPRAM 15 MG: 10 TABLET, FILM COATED ORAL at 10:07

## 2018-07-25 RX ADMIN — IPRATROPIUM BROMIDE AND ALBUTEROL SULFATE 3 ML: .5; 3 SOLUTION RESPIRATORY (INHALATION) at 04:07

## 2018-07-25 RX ADMIN — PIPERACILLIN SODIUM AND TAZOBACTAM SODIUM 4.5 G: 4; .5 INJECTION, POWDER, FOR SOLUTION INTRAVENOUS at 10:07

## 2018-07-25 RX ADMIN — IBUPROFEN 400 MG: 400 TABLET ORAL at 10:07

## 2018-07-25 NOTE — ASSESSMENT & PLAN NOTE
Pt c/o only mild abdominal pain after eating. No N/V.  Tolerating soft, bland diet.  Continue zosyn, day 3 of 7. Transition to augmentin at discharge.

## 2018-07-25 NOTE — PLAN OF CARE
Problem: Physical Therapy Goal  Goal: Physical Therapy Goal  Goals to be met by: 2018     Patient will increase functional independence with mobility by performin. Supine to sit with supervision  2. Sit to supine with supervision  3. Sit to stand transfer with Supervision  4. Gait  x 80 feet with Supervision using Rolling Walker.   5. Lower extremity exercise program x10 reps with supervision MET 18     Outcome: Ongoing (interventions implemented as appropriate)  Goal 5 met

## 2018-07-25 NOTE — PLAN OF CARE
[facility]  Jose Zaragoza MD, [facility]   MD Tiffanie Vu PA-C Renee Melancon, PA-C Rosanne Zeringue, [address] [address]  Office Phone: [phone]  Office Fax: [phone]         NURSING HOME ORDERS    07/26/2018    Admit to Nursing Home:  Regular Bed with Part B therapy services                                                  Diagnoses:  Active Hospital Problems    Diagnosis  POA    *Healthcare associated bacterial pneumonia [J15.9]  Yes     Priority: 1 - High    Diverticulitis [K57.92]  Yes     Priority: 2     Chronic diastolic congestive heart failure [I50.32]  Yes     Priority: 3      Chronic     2D Echo 7/23/18:    1 - Normal left ventricular systolic function (EF 55-60%).     2 - Impaired LV relaxation, normal LAP (grade 1 diastolic dysfunction).     3 - Normal right ventricular systolic function .     4 - The estimated PA systolic pressure is greater than 17 mmHg.      Depression [F32.9]  Yes     Priority: 4     GERD (gastroesophageal reflux disease) [K21.9]  Yes     Priority: 5     Anemia, macrocytic [D53.9]  Yes     Priority: 6     Tobacco abuse [Z72.0]  Yes     Priority: 7      Chronic    COPD (chronic obstructive pulmonary disease) [J44.9]  Yes     Chronic    Alcohol dependence with alcohol-induced mood disorder [F10.24]  Yes      Resolved Hospital Problems    Diagnosis Date Resolved POA    Elevated lactic acid level [R79.89] 07/25/2018 Yes       Patient is homebound due to:  Healthcare associated bacterial pneumonia    Allergies:Review of patient's allergies indicates:  No Known Allergies    Vitals:      Every shift (Skilled Nursing patients)    Diet: soft, bland diet x 1 week then advance to regular diet as tolerated    Acitivities:     - Up in a chair each morning as tolerated   - Ambulate with assistance to bathroom   - Scheduled walks once each shift (every 8 hours)   - May use walker,  cane, or self-propelled wheelchair    LABS:  Per facility protocol    Nursing Precautions:     - Fall precautions per nursing home protocol    CONSULTS:     Physical Therapy to evaluate and treat for Part B services     Occupational Therapy to evaluate and treat for Part B services     MISCELLANEOUS CARE:     Supplemental Oxygen: 2 liters via nasal cannula to keep oxygen saturation greater than 88% in this patient who is still smoking.      Routine Skin for Bedridden Patients:  Apply moisture barrier cream to all skin folds and wet areas in perineal area daily and after baths and all bowel movements.      Medications: Discontinue all previous medication orders, if any. See new list below.     Nando Morrison   Home Medication Instructions PALOMA:[id number]    Printed on:07/25/18 8805   Medication Information                      albuterol-ipratropium (DUO-NEB) 2.5 mg-0.5 mg/3 mL nebulizer solution  Take 3 mLs by nebulization every 4 (four) hours. Rescue             amoxicillin-clavulanate 875-125mg (AUGMENTIN) 875-125 mg per tablet  Take 1 tablet by mouth every 12 (twelve) hours. for 4 days. 7/26 through 7/29/18.    Dx: pneumonia              docusate sodium (COLACE) 100 MG capsule  Take 1 capsule (100 mg total) by mouth 2 (two) times daily.             enoxaparin (LOVENOX) 40 mg/0.4 mL Syrg  Inject 40 mg into the skin.             escitalopram oxalate (LEXAPRO) 10 MG tablet  Take 15 mg by mouth once daily.             folic acid (FOLVITE) 1 MG tablet  Take 1 mg by mouth once daily.             furosemide (LASIX) 40 MG tablet  Take 1 tablet (40 mg total) by mouth daily as needed (weight gain > 5 pounds in 24 hours or lower extremity edema).             guaiFENesin (MUCINEX) 600 mg 12 hr tablet  Take 600 mg by mouth 2 (two) times daily.             lactobacillus acidophilus & bulgar (LACTINEX) 100 million cell packet  Take 1 tablet by mouth 2 (two) times daily.             loratadine (CLARITIN) 10 mg tablet  Take 10 mg  by mouth once daily.             pantoprazole (PROTONIX) 40 MG tablet  Take 40 mg by mouth once daily.             predniSONE (DELTASONE) 20 MG tablet  Take 2 tablets (40 mg total) by mouth once daily. for 4 days. 7/27 through 7/30/18.              thiamine 100 MG tablet  Take 1 tablet (100 mg total) by mouth once daily.             traZODone (DESYREL) 100 MG tablet  Take 75 mg by mouth every evening.

## 2018-07-25 NOTE — PLAN OF CARE
Problem: Patient Care Overview  Goal: Plan of Care Review  Outcome: Ongoing (interventions implemented as appropriate)  Pt awake and alert. Course breath sounds ; Slight shortness of breath noted with activity . 2L nc with breathing breathing treatment. IV antibiotics given per orders. OOB to chair with assistance.

## 2018-07-25 NOTE — ASSESSMENT & PLAN NOTE
Alcohol abuse/dependence  Pt voluntarily admitted to [facility] for depression and alcohol use. Completed librium taper. No evidence of DTs.  Continue home lexapro, trazodone.  Continue folic acid and thiamine. Ordered PT/OT who note significant debility requiring therapy. Pt states he will get alcohol if he wants it wherever he is. States he does not plan to drink alcohol again but then asks me for a rum and coke when I am leaving his room.

## 2018-07-25 NOTE — PROGRESS NOTES
"Vancomycin Dosing and Monitoring Pharmacy Protocol    Nando Morrison is a 63 y.o. male    Height: 5' 3" (1.6 m)   Wt Readings from Last 3 Encounters:   07/24/18 97.9 kg (215 lb 13.3 oz)   09/21/16 90.7 kg (200 lb)   04/21/15 77.1 kg (170 lb)     Ideal body weight: 56.9 kg (125 lb 7.1 oz)  Adjusted ideal body weight: 73.3 kg (161 lb 9.6 oz)    Temp Readings from Last 3 Encounters:   07/25/18 96.9 °F (36.1 °C) (Axillary)   09/22/16 98.2 °F (36.8 °C) (Oral)   04/22/15 98.2 °F (36.8 °C)      Lab Results   Component Value Date/Time    WBC 6.89 07/25/2018 05:31 AM    WBC 3.30 (L) 07/24/2018 06:01 AM    WBC 5.19 07/23/2018 05:17 AM      Lab Results   Component Value Date/Time    CREATININE 1.1 07/25/2018 05:31 AM    CREATININE 1.3 07/24/2018 06:01 AM    CREATININE 1.0 07/23/2018 05:17 AM      Lab Results   Component Value Date/Time    LACTATE 1.8 07/25/2018 12:17 AM    LACTATE 3.3 (H) 07/24/2018 07:25 PM    LACTATE 4.2 (HH) 07/24/2018 03:48 PM       Serum creatinine: 1.1 mg/dL 07/25/18 0531  Estimated creatinine clearance: 71.3 mL/min    Antibiotics       Start     Stop Route Frequency Ordered    07/23/18 1430  vancomycin in dextrose 5 % 1 gram/250 mL IVPB 1,000 mg  (Vancomycin IVPB with levels panel)      -- IV Every 12 hours (non-standard times) 07/23/18 0251    07/23/18 1000  piperacillin-tazobactam 4.5 g in dextrose 5 % 100 mL IVPB (ready to mix system)      -- IV Every 8 hours (non-standard times) 07/23/18 0203          Antifungals       None            Microbiology Results (last 7 days)       Procedure Component Value Units Date/Time    Blood culture [[id number]] Collected:  07/23/18 0132    Order Status:  Completed Specimen:  Blood from Peripheral, Antecubital, Left Updated:  07/25/18 0612     Blood Culture, Routine No Growth to date     Blood Culture, Routine No Growth to date     Blood Culture, Routine No Growth to date    Blood culture [[id number]] Collected:  07/23/18 0132    Order Status:  Completed Specimen:  " Blood from Peripheral, Antecubital, Right Updated:  18 0612     Blood Culture, Routine No Growth to date     Blood Culture, Routine No Growth to date     Blood Culture, Routine No Growth to date            Indication/Target trough:   Pneumonia (Target trough: 15-20mcg/ml)    Hemodialysis:   N/A    Dosing Weight:   AdjBW--adjusted body weight  If ABW is greater than or equal to 30% over Ideal Body Weight, AdjBW will be used to calculate vancomycin dose.    Last Vancomycin dose: 1000 mg   Date/Time given:  05          Vancomycin level:  Recent Labs   Lab Result Units  18   1548   Vancomycin-Trough ug/mL  16.9     Recent Labs   Lab Result Units  18   1548   Vancomycin-Trough ug/mL  16.9       Per Protocol Initial/Adjustments Dosin. Initial/Adjustment Dose: NO CHANGE, continue the same Vancomycin dose of 1000mg q12hr  2. Vancomycin Trough Level will be drawn on  0400date/time    Pharmacy will continue to follow.    Please contact if you have any further questions. Thank you.    Pedro Luis Beckman, PharmD  [phone]

## 2018-07-25 NOTE — PT/OT/SLP PROGRESS
Occupational Therapy   Treatment    Name: Nando Morrison  MRN: [id number]  Admitting Diagnosis:  Healthcare associated bacterial pneumonia       Recommendations:     Discharge Recommendations:  (TBD)  Discharge Equipment Recommendations:     Barriers to discharge:       Subjective     Communicated with: nsg prior to session.  Pain/Comfort:  · Pain Rating 1: 0/10    Patients cultural, spiritual, Church conflicts given the current situation:      Objective:     Patient found with: oxygen, peripheral IV    General Precautions: Standard, fall, respiratory   Orthopedic Precautions:N/A   Braces: N/A     Occupational Performance:    Bed Mobility:    · Patient completed Rolling/Turning to Left with  supervision  · Patient completed Rolling/Turning to Right with contact guard assistance  · Patient completed Scooting/Bridging with minimum assistance  · Patient completed Supine to Sit with contact guard assistance  · Patient completed Sit to Supine with minimum assistance     Functional Mobility/Transfers:  · Functional Mobility: Pt t/f supine <> seated EOB and required increased aid to return to supine 2/2 fatigue.     Activities of Daily Living:  · Lower Body Dressing: minimum assistance to don socks seated EOB.     Patient left supine with all lines intact, call button in reach, bed alarm on, nsg notified and respiratory therapist present    [facility] 6 Click:  [facility] Total Score:      Treatment & Education:  Pt asked for socks and stated that he asked earlier in the day but was not given socks. Pt required increased coaxing to don socks and utilized figure of four to don socks. After donning R sock first, pt with noted increased WOB and lip discoloration. OT called nsg for O2 monitor; Pt between 94-96% O2 while seated EOB statically. Upon donning L sock, pt with O2 perfusion of 87% for short time; Pt reminded to complete pursed lip breathing for increased O2 perfusion. Pt compliant with good return of stats. Pt with no c/o  dizziness or lightheadedness. Pt remained seated EOB ~15 min and required increased aid to return to supine 2/2 fatigue. Respiratory therapist entering room upon session completion and RT notified of increased WOB with activity.  Education:    Assessment:     Nando Morrison is a 63 y.o. male with a medical diagnosis of Healthcare associated bacterial pneumonia.  He presents with deconditioning.  Performance deficits affecting function are weakness, impaired endurance, impaired functional mobilty, impaired self care skills, impaired balance, impaired cardiopulmonary response to activity, decreased coordination.      Rehab Prognosis:  Good; patient would benefit from acute skilled OT services to address these deficits and reach maximum level of function.       Plan:     Patient to be seen 5 x/week to address the above listed problems via self-care/home management, therapeutic activities, therapeutic exercises  · Plan of Care Expires: 08/24/18  · Plan of Care Reviewed with: patient    This Plan of care has been discussed with the patient who was involved in its development and understands and is in agreement with the identified goals and treatment plan    GOALS:    Occupational Therapy Goals        Problem: Occupational Therapy Goal    Goal Priority Disciplines Outcome Interventions   Occupational Therapy Goal     OT, PT/OT Ongoing (interventions implemented as appropriate)    Description:  Goals to be met by: 08/24/18     Patient will increase functional independence with ADLs by performing:    LE Dressing with Supervision.  Grooming while standing at sink with Supervision.  Toileting from toilet with Supervision for hygiene and clothing management.   Toilet transfer to toilet with Supervision.  Increased functional strength to WFL for ADLs.                      Time Tracking:     OT Date of Treatment: 07/25/18  OT Start Time: 1550  OT Stop Time: 1610  OT Total Time (min): 20 min    Billable Minutes:Self Care/Home  Management 20    Minda Keys, OT  7/25/2018

## 2018-07-25 NOTE — PT/OT/SLP PROGRESS
Physical Therapy Treatment    Patient Name:  Nando Morrison   MRN:  [id number]    Recommendations:     Discharge Recommendations:   (TBD)   Discharge Equipment Recommendations: none   Barriers to discharge: decreased mobility,endurance and strength    Assessment:     Nando Morrison is a 63 y.o. male admitted with a medical diagnosis of Healthcare associated bacterial pneumonia.  He presents with the following impairments/functional limitations:  weakness, impaired endurance, impaired functional mobilty, gait instability, impaired balance, decreased lower extremity function, impaired coordination, impaired cardiopulmonary response to activity pt with good participation and requires SBA/CGA with gait as well as decreased endurance,pt may benefit from continuing PT services upon discharge.    Rehab Prognosis:  nsg; patient would benefit from acute skilled PT services to address these deficits and reach maximum level of function.      Recent Surgery: * No surgery found *      Plan:     During this hospitalization, patient to be seen 6 x/week to address the above listed problems via gait training, therapeutic activities, therapeutic exercises  · Plan of Care Expires:  08/24/18   Plan of Care Reviewed with: patient    Subjective     Communicated with ndg prior to session.  Patient found up in chair upon PT entry to room, agreeable to treatment.      Chief Complaint: n/a  Patient comments/goals: pt states he lives alone    Pain/Comfort:  · Pain Rating 1: 0/10    Patients cultural, spiritual, Ca[employer] conflicts given the current situation: no    Objective:     Patient found with: oxygen, peripheral IV     General Precautions: Standard, fall, respiratory   Orthopedic Precautions:N/A   Braces: N/A     Functional Mobility:  · Transfers:     · Sit to Stand:  stand by assistance with rolling walker  · Gait: amb ~65' X 1 with RW and SBA with O2 donned and IV in tow  · Balance: requires RE for standing balance      AM-PAC 6 CLICK  MOBILITY  Turning over in bed (including adjusting bedclothes, sheets and blankets)?: 3  Sitting down on and standing up from a chair with arms (e.g., wheelchair, bedside commode, etc.): 3  Moving from lying on back to sitting on the side of the bed?: 3  Moving to and from a bed to a chair (including a wheelchair)?: 3  Need to walk in hospital room?: 3  Climbing 3-5 steps with a railing?: 1  Basic Mobility Total Score: 16       Therapeutic Activities and Exercises: le supine ex's X 10-12 reps inc: ap,qs,hs,abd/add,slr       Patient left up in chair with all lines intact, call button in reach, chair alarm on and nsg notified..    GOALS: see general POC   Physical Therapy Goals        Problem: Physical Therapy Goal    Goal Priority Disciplines Outcome Goal Variances Interventions   Physical Therapy Goal     PT/OT, PT Ongoing (interventions implemented as appropriate)     Description:  Goals to be met by: 2018     Patient will increase functional independence with mobility by performin. Supine to sit with supervision  2. Sit to supine with supervision  3. Sit to stand transfer with Supervision  4. Gait  x 80 feet with Supervision using Rolling Walker.   5. Lower extremity exercise program x10 reps with supervision MET 18                       Time Tracking:     PT Received On: 18  PT Start Time: 802     PT Stop Time: 828  PT Total Time (min): 26 min     Billable Minutes: Gait Training 16 and Therapeutic Exercise 10    Treatment Type: Treatment  PT/PTA: PTA     PTA Visit Number: 1     Augie Patton, PTA  2018

## 2018-07-25 NOTE — ASSESSMENT & PLAN NOTE
Appears compensated. Held lasix for slightly elevated creatinine. Likely does not need scheduled lasix at discharge with current EF 55-60% and only grade 1 diastolic dysfunction. On notes from [facility], states that he was taking for lower extremity edema. Pt denies any h/o CHF.  Monitor I&O and daily weight.

## 2018-07-25 NOTE — SUBJECTIVE & OBJECTIVE
Interval History: Pt c/o mild abdominal pain after eating breakfast. Denies N/V, CP, SOB.  States that he wants to return to NH instead of [facility] at discharge.     Review of Systems   Constitutional: Positive for fatigue. Negative for chills and fever.   Respiratory: Negative for cough and shortness of breath.    Cardiovascular: Negative for chest pain and palpitations.   Gastrointestinal: Positive for abdominal pain. Negative for nausea and vomiting.   Neurological: Negative for headaches.   Psychiatric/Behavioral: Positive for dysphoric mood. Negative for confusion.     Objective:     Vital Signs (Most Recent):  Temp: 96.9 °F (36.1 °C) (07/25/18 0731)  Pulse: 82 (07/25/18 0731)  Resp: 14 (07/25/18 0731)  BP: 112/60 (07/25/18 0731)  SpO2: 95 % (07/25/18 0731) Vital Signs (24h Range):  Temp:  [96.7 °F (35.9 °C)-98.5 °F (36.9 °C)] 96.9 °F (36.1 °C)  Pulse:  [67-96] 82  Resp:  [14-20] 14  SpO2:  [93 %-97 %] 95 %  BP: (100-122)/(54-60) 112/60     Weight: 97.9 kg (215 lb 13.3 oz)  Body mass index is 38.23 kg/m².    Intake/Output Summary (Last 24 hours) at 07/25/18 0955  Last data filed at 07/25/18 0715   Gross per 24 hour   Intake             1800 ml   Output              950 ml   Net              850 ml      Physical Exam   Constitutional: He is oriented to person, place, and time. No distress.   Morbidly obese   Cardiovascular: Normal rate and regular rhythm.    Pulmonary/Chest: Effort normal. No respiratory distress.   Diminished breath sounds throughout. Mild wheezing.    Abdominal: Soft. Bowel sounds are normal. He exhibits no distension.   Musculoskeletal: He exhibits no edema.   Neurological: He is alert and oriented to person, place, and time.   Psychiatric:   Flat affect. Calm and cooperative. States he wants to return to NH, not [facility], at discharge. Denies SI.    Nursing note and vitals reviewed.      Significant Labs:   BMP:   Recent Labs  Lab 07/25/18  0531   *      K 3.7      CO2 28    BUN 11   CREATININE 1.1   CALCIUM 8.4*   MG 1.7     CBC:   Recent Labs  Lab 07/24/18  0601 07/25/18  0531   WBC 3.30* 6.89   HGB 13.7* 13.2*   HCT 42.8 41.6    288     Lactic Acid:   Recent Labs  Lab 07/24/18  1548 07/24/18  1925 07/25/18  0017   LACTATE 4.2* 3.3* 1.8       Significant Imaging: no new

## 2018-07-25 NOTE — ASSESSMENT & PLAN NOTE
Elevated lactic acid - resolved  COPD (chronic obstructive pulmonary disease)  Requiring less oxygen. Continue vanc and zosyn, day #3. Vanc trough 16.9 on 7/24.  Received IV methylprednisolone x 2 doses.  Starting oral prednisone 40 mg daily x 4 days.  Elevated lactic acid 3.6 > 4.2 > 3.3 > 1.8. Given 1000 cc IVF bolus. Normal procalcitonin. Blood cultures NG x 3 days.  Continue IS and acapella.  Duonebs q4h. Mucinex. Supplemental O2, wean as tolerated. 96-97% on room air during my examination. Pt is smoker so likely baseline 88-92%.

## 2018-07-26 VITALS
TEMPERATURE: 99 F | DIASTOLIC BLOOD PRESSURE: 64 MMHG | BODY MASS INDEX: 38.24 KG/M2 | SYSTOLIC BLOOD PRESSURE: 132 MMHG | HEIGHT: 63 IN | RESPIRATION RATE: 20 BRPM | WEIGHT: 215.81 LBS | OXYGEN SATURATION: 97 % | HEART RATE: 83 BPM

## 2018-07-26 LAB
ANION GAP SERPL CALC-SCNC: 6 MMOL/L
BASOPHILS # BLD AUTO: 0.01 K/UL
BASOPHILS NFR BLD: 0.2 %
BUN SERPL-MCNC: 13 MG/DL
CALCIUM SERPL-MCNC: 8.9 MG/DL
CHLORIDE SERPL-SCNC: 105 MMOL/L
CO2 SERPL-SCNC: 32 MMOL/L
CREAT SERPL-MCNC: 1 MG/DL
DIFFERENTIAL METHOD: ABNORMAL
EOSINOPHIL # BLD AUTO: 0 K/UL
EOSINOPHIL NFR BLD: 0.2 %
ERYTHROCYTE [DISTWIDTH] IN BLOOD BY AUTOMATED COUNT: 14.9 %
EST. GFR  (AFRICAN AMERICAN): >60 ML/MIN/1.73 M^2
EST. GFR  (NON AFRICAN AMERICAN): >60 ML/MIN/1.73 M^2
GLUCOSE SERPL-MCNC: 141 MG/DL
HCT VFR BLD AUTO: 42.5 %
HGB BLD-MCNC: 13.3 G/DL
LYMPHOCYTES # BLD AUTO: 0.7 K/UL
LYMPHOCYTES NFR BLD: 12.9 %
MCH RBC QN AUTO: 32.8 PG
MCHC RBC AUTO-ENTMCNC: 31.3 G/DL
MCV RBC AUTO: 105 FL
MONOCYTES # BLD AUTO: 0.7 K/UL
MONOCYTES NFR BLD: 13.5 %
NEUTROPHILS # BLD AUTO: 3.8 K/UL
NEUTROPHILS NFR BLD: 72.4 %
PLATELET # BLD AUTO: 342 K/UL
PMV BLD AUTO: 9.3 FL
POTASSIUM SERPL-SCNC: 3.8 MMOL/L
RBC # BLD AUTO: 4.05 M/UL
SODIUM SERPL-SCNC: 143 MMOL/L
VANCOMYCIN TROUGH SERPL-MCNC: 19.1 UG/ML
WBC # BLD AUTO: 5.27 K/UL

## 2018-07-26 PROCEDURE — 97535 SELF CARE MNGMENT TRAINING: CPT

## 2018-07-26 PROCEDURE — 97116 GAIT TRAINING THERAPY: CPT

## 2018-07-26 PROCEDURE — G8989 SELF CARE D/C STATUS: HCPCS | Mod: CK

## 2018-07-26 PROCEDURE — 80202 ASSAY OF VANCOMYCIN: CPT

## 2018-07-26 PROCEDURE — 25000003 PHARM REV CODE 250: Performed by: PHYSICIAN ASSISTANT

## 2018-07-26 PROCEDURE — 94664 DEMO&/EVAL PT USE INHALER: CPT

## 2018-07-26 PROCEDURE — 63600175 PHARM REV CODE 636 W HCPCS: Performed by: PHYSICIAN ASSISTANT

## 2018-07-26 PROCEDURE — 36415 COLL VENOUS BLD VENIPUNCTURE: CPT

## 2018-07-26 PROCEDURE — 94640 AIRWAY INHALATION TREATMENT: CPT

## 2018-07-26 PROCEDURE — 27000221 HC OXYGEN, UP TO 24 HOURS

## 2018-07-26 PROCEDURE — 85025 COMPLETE CBC W/AUTO DIFF WBC: CPT

## 2018-07-26 PROCEDURE — 97110 THERAPEUTIC EXERCISES: CPT

## 2018-07-26 PROCEDURE — G8988 SELF CARE GOAL STATUS: HCPCS | Mod: CI

## 2018-07-26 PROCEDURE — 94799 UNLISTED PULMONARY SVC/PX: CPT

## 2018-07-26 PROCEDURE — 25000242 PHARM REV CODE 250 ALT 637 W/ HCPCS: Performed by: PHYSICIAN ASSISTANT

## 2018-07-26 PROCEDURE — 63600175 PHARM REV CODE 636 W HCPCS: Performed by: EMERGENCY MEDICINE

## 2018-07-26 PROCEDURE — G8987 SELF CARE CURRENT STATUS: HCPCS | Mod: CK

## 2018-07-26 PROCEDURE — 99900035 HC TECH TIME PER 15 MIN (STAT)

## 2018-07-26 PROCEDURE — 80048 BASIC METABOLIC PNL TOTAL CA: CPT

## 2018-07-26 PROCEDURE — 94761 N-INVAS EAR/PLS OXIMETRY MLT: CPT

## 2018-07-26 PROCEDURE — 25000003 PHARM REV CODE 250: Performed by: EMERGENCY MEDICINE

## 2018-07-26 RX ORDER — AMOXICILLIN AND CLAVULANATE POTASSIUM 875; 125 MG/1; MG/1
1 TABLET, FILM COATED ORAL EVERY 12 HOURS
Status: DISCONTINUED | OUTPATIENT
Start: 2018-07-26 | End: 2018-07-26 | Stop reason: HOSPADM

## 2018-07-26 RX ADMIN — GUAIFENESIN 600 MG: 600 TABLET, EXTENDED RELEASE ORAL at 08:07

## 2018-07-26 RX ADMIN — Medication 100 MG: at 08:07

## 2018-07-26 RX ADMIN — CETIRIZINE HYDROCHLORIDE 10 MG: 5 TABLET, FILM COATED ORAL at 08:07

## 2018-07-26 RX ADMIN — PIPERACILLIN SODIUM AND TAZOBACTAM SODIUM 4.5 G: 4; .5 INJECTION, POWDER, FOR SOLUTION INTRAVENOUS at 05:07

## 2018-07-26 RX ADMIN — IPRATROPIUM BROMIDE AND ALBUTEROL SULFATE 3 ML: .5; 3 SOLUTION RESPIRATORY (INHALATION) at 07:07

## 2018-07-26 RX ADMIN — LACTOBACILLUS ACIDOPHILUS / LACTOBACILLUS BULGARICUS 1 EACH: 100 MILLION CFU STRENGTH GRANULES at 08:07

## 2018-07-26 RX ADMIN — ESCITALOPRAM 15 MG: 10 TABLET, FILM COATED ORAL at 08:07

## 2018-07-26 RX ADMIN — PREDNISONE 40 MG: 20 TABLET ORAL at 08:07

## 2018-07-26 RX ADMIN — PANTOPRAZOLE SODIUM 40 MG: 40 TABLET, DELAYED RELEASE ORAL at 08:07

## 2018-07-26 RX ADMIN — FOLIC ACID 1 MG: 1 TABLET ORAL at 08:07

## 2018-07-26 RX ADMIN — IPRATROPIUM BROMIDE AND ALBUTEROL SULFATE 3 ML: .5; 3 SOLUTION RESPIRATORY (INHALATION) at 12:07

## 2018-07-26 RX ADMIN — IPRATROPIUM BROMIDE AND ALBUTEROL SULFATE 3 ML: .5; 3 SOLUTION RESPIRATORY (INHALATION) at 11:07

## 2018-07-26 RX ADMIN — VANCOMYCIN HYDROCHLORIDE 1000 MG: 1 INJECTION, POWDER, LYOPHILIZED, FOR SOLUTION INTRAVENOUS at 05:07

## 2018-07-26 RX ADMIN — IPRATROPIUM BROMIDE AND ALBUTEROL SULFATE 3 ML: .5; 3 SOLUTION RESPIRATORY (INHALATION) at 04:07

## 2018-07-26 NOTE — ASSESSMENT & PLAN NOTE
Pt c/o only mild abdominal pain after eating. No N/V.  Tolerating soft, bland diet.  Received zosyn x 4 days. Continue Augmentin x 3 more days to complete 7 day course.

## 2018-07-26 NOTE — PLAN OF CARE
07/26/18 1234   Final Note   Assessment Type Final Discharge Note   Discharge Disposition [facility] Fac [facility]   What phone number can be called within the next 1-3 days to see how you are doing after discharge? [phone]   Hospital Follow Up  Appt(s) scheduled? Yes   Discharge plans and expectations educations in teach back method with documentation complete? Yes   Right Care Referral Info   Post Acute Recommendation Other   Referral Type (nursing home)   Facility Name ([facility])   City, [facility] ([place], [place])

## 2018-07-26 NOTE — PROGRESS NOTES
Transfer facility orders and clinicals faxed to [facility] to review for long term/jail care re-admission.

## 2018-07-26 NOTE — ASSESSMENT & PLAN NOTE
Elevated lactic acid - resolved  COPD (chronic obstructive pulmonary disease)  Requiring less oxygen. Received vanc and zosyn x 4 days. Vanc trough 16.9 on 7/24. Discharging on Augmentin to complete 7 day course of treatment.   Received IV methylprednisolone x 2 doses.  Continue oral prednisone 40 mg daily x 3 more days.  Elevated lactic acid 3.6 > 4.2 > 3.3 > 1.8. Given 1000 cc IVF bolus. Normal procalcitonin. Blood cultures NG x 4 days.  Continue Duonebs q4h PRN. Mucinex. Supplemental O2, wean as tolerated to keep sats 88-92%.

## 2018-07-26 NOTE — DISCHARGE SUMMARY
[facility] Hospital Medicine  Discharge Summary      Patient Name: Nando Morrison  MRN: [id number]  Admission Date: 7/22/2018  Hospital Length of Stay: 3 days  Discharge Date and Time:  07/26/2018 11:17 AM  Attending Physician: Farhat Colvin MD   Discharging Provider: Teresa Peña PA-C  Primary Care Provider: Primary Doctor No      HPI:   Mr. Morrison is a 64 yo male with severe depression, history of alcohol abuse, recurrent diverticulitis, COPD, GERD, and diastolic CHF who presented to [facility] ED on 7/22/2018 with SOB and abdominal pain. He currently resides at [facility] voluntarily d/t depression and alcohol use. History is limited by pt's minimal expression. He reports that his stomach is hurting and he has trouble breathing. He was sent to the ED by the NH when his temp was found to be 101.6 and his mental status was altered. In the ED, CT abdomen showed diverticulitis. CXR showed coarse interstitial attenuation likely related to mild interstitial edema. He was started on vanc and zosyn. He was admitted to hospital medicine.     * No surgery found *      Hospital Course:   Hypoxia improved.  Repeat CXR 7/24: worsening LLL consolidation.Blood culture NG x 4 days.  Elevated lactic acid: 3.6 > 4.2  so given IVF with improvement to 1.8.  Received IV zosyn and vancomycin x 4 days. Discharging on oral Augmentin to complete 7 day course of treatment. Tolerating diet.  Worked with PT/OT with noted significant debility requiring additional therapy (Part B therapy). [facility]      Consults: none    Physical Exam   Constitutional: He is oriented to person, place, and time. No distress.   Morbidly obese   Cardiovascular: Normal rate and regular rhythm.    Pulmonary/Chest: Effort normal. No respiratory distress.   Diminished breath sounds throughout. Mild wheezing.    Abdominal: Soft. Bowel sounds are normal. He exhibits no distension.   Musculoskeletal:  He exhibits no edema.   Neurological: He is alert and oriented to person, place, and time.   Psychiatric:   Flat affect. Calm and cooperative.    Nursing note and vitals reviewed.    * Healthcare associated bacterial pneumonia    Elevated lactic acid - resolved  COPD (chronic obstructive pulmonary disease)  Requiring less oxygen. Received vanc and zosyn x 4 days. Vanc trough 16.9 on 7/24. Discharging on Augmentin to complete 7 day course of treatment.   Received IV methylprednisolone x 2 doses.  Continue oral prednisone 40 mg daily x 3 more days.  Elevated lactic acid 3.6 > 4.2 > 3.3 > 1.8. Given 1000 cc IVF bolus. Normal procalcitonin. Blood cultures NG x 4 days.  Continue Duonebs q4h PRN. Mucinex. Supplemental O2, wean as tolerated to keep sats 88-92%.          Diverticulitis    Pt c/o only mild abdominal pain after eating. No N/V.  Tolerating soft, bland diet.  Received zosyn x 4 days. Continue Augmentin x 3 more days to complete 7 day course.         Chronic diastolic congestive heart failure    Appears compensated. Held lasix for slightly elevated creatinine. Discontinued at discharge with current EF 55-60% and only grade 1 diastolic dysfunction. On notes from [facility], states that he was taking for lower extremity edema. Pt denies any h/o CHF.          Depression    Alcohol abuse/dependence  Pt voluntarily admitted to [facility] for depression and alcohol use. Completed librium taper. No evidence of DTs.  Continue home lexapro, trazodone.  Continue folic acid and thiamine. Ordered PT/OT who note significant debility requiring therapy. Pt states he will get alcohol if he wants it wherever he is. States he does not plan to drink alcohol again.         GERD (gastroesophageal reflux disease)    Continue home protonix.         Anemia, macrocytic    Noted since 2015, likely 2/2 alcohol abuse. Hgb stable. No visible, active bleeding. .           Tobacco abuse    Counseled; refused nicotine patch; Pt states will likely not  stop smoking.             Final Active Diagnoses:    Diagnosis Date Noted POA    PRINCIPAL PROBLEM:  Healthcare associated bacterial pneumonia [J15.9] 07/23/2018 Yes    Diverticulitis [K57.92] 03/08/2015 Yes    Chronic diastolic congestive heart failure [I50.32] 07/23/2018 Yes     Chronic    Depression [F32.9] 03/14/2015 Yes    GERD (gastroesophageal reflux disease) [K21.9] 07/23/2018 Yes    Anemia, macrocytic [D53.9] 03/16/2015 Yes    Tobacco abuse [Z72.0] 09/07/2014 Yes     Chronic    COPD (chronic obstructive pulmonary disease) [J44.9] 07/23/2018 Yes     Chronic    Alcohol dependence with alcohol-induced mood disorder [F10.24] 03/18/2015 Yes      Problems Resolved During this Admission:    Diagnosis Date Noted Date Resolved POA    Elevated lactic acid level [R79.89] 07/24/2018 07/25/2018 Yes       Discharged Condition: stable    Disposition: half-way Nursing Home    Follow Up:  Follow-up Information     [facility].    Specialties:  Nursing Home Agency, SNF Agency  Contact information:  [address] [place] [address] [phone]                 Patient Instructions:     Notify your health care provider if you experience any of the following:  severe uncontrolled pain     Notify your health care provider if you experience any of the following:  difficulty breathing or increased cough     Activity as tolerated         Significant Diagnostic Studies: Labs:   BMP:   Recent Labs  Lab 07/25/18  0531 07/26/18  0446   * 141*    143   K 3.7 3.8    105   CO2 28 32*   BUN 11 13   CREATININE 1.1 1.0   CALCIUM 8.4* 8.9   MG 1.7  --     and CBC   Recent Labs  Lab 07/25/18  0531 07/26/18  0446   WBC 6.89 5.27   HGB 13.2* 13.3*   HCT 41.6 42.5    342     X-ray Chest 1 View    Result Date: 7/24/2018  EXAMINATION: XR CHEST 1 VIEW CLINICAL HISTORY: pneumonia; TECHNIQUE: Single frontal view of the chest was performed. COMPARISON: 07/22/2018 FINDINGS: Monitoring leads  overlie the chest.  The cardiomediastinal silhouette is stable.  The trachea is midline.  Lungs are symmetrically expanded with persistent coarse interstitial attenuation.  There is increased opacity involving the left lower lung zone with apparent blunting of the left costophrenic angle likely reflecting small volume of pleural fluid as well as underlying atelectatic/consolidative change.  No evidence of pneumothorax.  Visualized osseous structures are intact.     As above.     Electronically signed by: Brittaney Jaimes MD   Date:    07/24/2018 Time:    06:37    X-ray Chest 1 View    Result Date: 7/22/2018  EXAMINATION: XR CHEST 1 VIEW CLINICAL HISTORY: wheezing; fever; TECHNIQUE: Single frontal view of the chest was performed. COMPARISON: None FINDINGS: Cardiac silhouette is normal in size.  Lungs are symmetrically expanded.  Coarse interstitial lung changes are seen.  No evidence of focal consolidative process, pneumothorax, or significant effusion.  No acute osseous abnormality identified.     Coarse interstitial attenuation, nonspecific but may reflect chronic change versus mild interstitial edema.     Electronically signed by: Grover Galan MD   Date:    07/22/2018 Time:    22:54    Ct Abdomen Pelvis With Contrast    Result Date: 7/23/2018  EXAMINATION: CT ABDOMEN PELVIS WITH CONTRAST CLINICAL HISTORY: abd pain and fever; TECHNIQUE: Low dose axial images, sagittal and coronal reformations were obtained from the lung bases to the pubic symphysis following the IV administration of 100 mL of Omnipaque 350 and the oral administration of 30 mL of Omnipaque 350. COMPARISON: Previous CT abdomen and pelvis from March 2015 is unavailable for review on PACS. FINDINGS: The visualized portion of the heart is unremarkable.  Mild focal opacities and consolidative changes seen within the superior segment of the left lower lobe.  Mild ground-glass attenuation with peripheral reticulations are also seen within the lungs which may  suggest underlying chronic interstitial lung disease.  No pleural effusion. Liver is enlarged measuring 20 cm and diffusely hypoattenuating in appearance consistent with diffuse fatty infiltration.  There is no intra-or extrahepatic biliary ductal dilatation.  The gallbladder is unremarkable.  The stomach, pancreas, spleen, and adrenal glands are unremarkable. Kidneys are functioning.  No evidence of hydronephrosis.  No abnormalities seen along the ureteral courses.  Urinary bladder is nondistended.  Prostate is unremarkable. Appendix is visualized and is unremarkable.  Postsurgical changes are visualized consistent with partial sigmoid resection.  Mild adjacent stranding is seen within the left lower quadrant.  There is colonic diverticulosis also seen within the region.  Mild increased intramural fat is seen within the cecum.  No evidence of abnormal small bowel dilatation or obstruction.  No free air or free fluid. Aorta tapers normally with moderate atherosclerosis. No acute osseous abnormality identified. Subcutaneous soft tissue structures are unremarkable.     1. Colonic diverticulosis with postsurgical changes of partial sigmoid resection.  There is mild stranding seen in the left lower quadrant.  Prior CT abdomen and pelvis are unavailable for review on PACS to determine if this may reflect postsurgical change versus acute inflammation.  Correlate for localizing left lower quadrant pain or symptoms of acute diverticulitis. 2. Mild patchy opacity/consolidative change seen within the superior segment of the left lower lobe which may reflect aspiration or developing pneumonia.  Possible underlying chronic interstitial lung disease. 3. Hepatomegaly with hepatic steatosis. 4. Additional findings as detailed above.     Electronically signed by: Grover Galan MD   Date:    07/23/2018 Time:    00:30      Pending Diagnostic Studies:     None         Medications:  Reconciled Home Medications:      Medication List       START taking these medications    albuterol-ipratropium 2.5 mg-0.5 mg/3 mL nebulizer solution  Commonly known as:  DUO-NEB  Take 3 mLs by nebulization every 4 (four) hours. Rescue     amoxicillin-clavulanate 875-125mg 875-125 mg per tablet  Commonly known as:  AUGMENTIN  Take 1 tablet by mouth every 12 (twelve) hours. for 4 days     predniSONE 20 MG tablet  Commonly known as:  DELTASONE  Take 2 tablets (40 mg total) by mouth once daily. for 4 days  Start taking on:  7/27/2018        CHANGE how you take these medications    furosemide 40 MG tablet  Commonly known as:  LASIX  Take 1 tablet (40 mg total) by mouth daily as needed (weight gain > 5 pounds in 24 hours or lower extremity edema).  What changed:  · when to take this  · reasons to take this     traZODone 100 MG tablet  Commonly known as:  DESYREL  Take 75 mg by mouth every evening.  What changed:  Another medication with the same name was removed. Continue taking this medication, and follow the directions you see here.        CONTINUE taking these medications    docusate sodium 100 MG capsule  Commonly known as:  COLACE  Take 1 capsule (100 mg total) by mouth 2 (two) times daily.     escitalopram oxalate 10 MG tablet  Commonly known as:  LEXAPRO  Take 15 mg by mouth once daily.     folic acid 1 MG tablet  Commonly known as:  FOLVITE  Take 1 mg by mouth once daily.     guaiFENesin 600 mg 12 hr tablet  Commonly known as:  MUCINEX  Take 600 mg by mouth 2 (two) times daily.     lactobacillus acidophilus & bulgar 100 million cell packet  Commonly known as:  LACTINEX  Take 1 tablet by mouth 2 (two) times daily.     loratadine 10 mg tablet  Commonly known as:  CLARITIN  Take 10 mg by mouth once daily.     LOVENOX 40 mg/0.4 mL Syrg  Generic drug:  enoxaparin  Inject 40 mg into the skin.     pantoprazole 40 MG tablet  Commonly known as:  PROTONIX  Take 40 mg by mouth once daily.     thiamine 100 MG tablet  Take 1 tablet (100 mg total) by mouth once daily.        STOP  taking these medications    etodolac 400 MG tablet  Commonly known as:  LODINE     folic acid-vit B6-vit B12 2.5-25-2 mg 2.5-25-2 mg Tab  Commonly known as:  FOLBIC or Equiv     gabapentin 100 MG capsule  Commonly known as:  NEURONTIN     mirtazapine 45 MG tablet  Commonly known as:  REMERON     potassium chloride SA 10 MEQ tablet  Commonly known as:  K-DUR,KLOR-CON            Indwelling Lines/Drains at time of discharge:   Lines/Drains/Airways          No matching active lines, drains, or airways          Time spent on the discharge of patient: 50 minutes  Patient was seen and examined on the date of discharge and determined to be suitable for discharge.         Treesa Peña PA-C  Department of Hospital Medicine  [facility]  Pager: [phone]

## 2018-07-26 NOTE — PT/OT/SLP PROGRESS
Physical Therapy Treatment    Patient Name:  Nando Morrison   MRN:  [id number]    Recommendations:     Discharge Recommendations:   (back to [facility] home with part B therapy,spoke with eavaluating PT)   Discharge Equipment Recommendations: none   Barriers to discharge: decreased mobility,endurance and strength    Assessment:     Nando Morrison is a 63 y.o. male admitted with a medical diagnosis of Healthcare associated bacterial pneumonia.  He presents with the following impairments/functional limitations:  weakness, impaired endurance, impaired functional mobilty, gait instability, impaired balance, decreased lower extremity function, impaired coordination, impaired cardiopulmonary response to activity pt with good participation and is at a S/SBA level,pt will benefit from continuing PT services upon discharge to increase functional independence.    Rehab Prognosis:  Good; patient would benefit from acute skilled PT services to address these deficits and reach maximum level of function.      Recent Surgery: * No surgery found *      Plan:     During this hospitalization, patient to be seen 6 x/week to address the above listed problems via gait training, therapeutic activities, therapeutic exercises  · Plan of Care Expires:  08/24/18   Plan of Care Reviewed with: patient    Subjective     Communicated with [facility] prior to session.  Patient found supine upon PT entry to room, agreeable to treatment.      Chief Complaint: n/a  Patient comments/goals: pt states he has a strong will  Pain/Comfort:  · Pain Rating 1: 0/10    Patients cultural, spiritual, Mu-ism conflicts given the current situation: no    Objective:     Patient found with: oxygen, peripheral IV     General Precautions: Standard, fall, respiratory   Orthopedic Precautions:N/A   Braces: N/A     Functional Mobility:  · Bed Mobility:     · Supine to Sit: modified independence and supervision  · Transfers:     · Sit to Stand:  stand by assistance with rolling  walker  · Bed to Chair: stand by assistance with  rolling walker  using  ambulation  · Gait: amb ~65' X 2 with RW and SBA  · Balance: good sitting balance      AM-PAC 6 CLICK MOBILITY  Turning over in bed (including adjusting bedclothes, sheets and blankets)?: 3  Sitting down on and standing up from a chair with arms (e.g., wheelchair, bedside commode, etc.): 3  Moving from lying on back to sitting on the side of the bed?: 3  Moving to and from a bed to a chair (including a wheelchair)?: 3  Need to walk in hospital room?: 3  Climbing 3-5 steps with a railing?: 1  Basic Mobility Total Score: 16       Therapeutic Activities and Exercises: le seated ex's X 15 reps inc: ap,laq,hip flex,abd/add,pt's O2 sats on rm air at rest 94%and post gait 86%,O2 redonned       Patient left up in chair with all lines intact, call button in reach, chair alarm on and nsg notified..    GOALS: see general POC   Physical Therapy Goals        Problem: Physical Therapy Goal    Goal Priority Disciplines Outcome Goal Variances Interventions   Physical Therapy Goal     PT/OT, PT Ongoing (interventions implemented as appropriate)     Description:  Goals to be met by: 2018     Patient will increase functional independence with mobility by performin. Supine to sit with supervision  2. Sit to supine with supervision   3. Sit to stand transfer with Supervision MET 18  4. Gait  x 80 feet with Supervision using Rolling Walker.   5. Lower extremity exercise program x10 reps with supervision MET 18                        Time Tracking:     PT Received On: 18  PT Start Time: 946     PT Stop Time:   PT Total Time (min): 26 min     Billable Minutes: Gait Training 15 and Therapeutic Exercise 11    Treatment Type: Treatment  PT/PTA: PTA     PTA Visit Number: 2     Augie Patton, PTA  2018

## 2018-07-26 NOTE — PROGRESS NOTES
received phone call from Erica with [facility] stating she received and reviewed transfer facility orders/clinicals.   Erica gave permission for patient to [facility] term/[facility] care with part B therapy.  Erica gave permission for patient to readmit to room 143 and [facility] nurse to call report to [phone] prior to discharge.     informed bedside nurse, Aniyah patient is ready for discharge.  She can call report to number located on front of ambulance.  Bedside nurse gave permission for  to arrange 1:30pm wheelchair van transport.     informed patient he is ready for readmission to [facility] today.  He agreed with plan and was without questions or concerns.  [facility]     [facility] with oxygen for 1:30pm transport.

## 2018-07-26 NOTE — ASSESSMENT & PLAN NOTE
Alcohol abuse/dependence  Pt voluntarily admitted to [facility] for depression and alcohol use. Completed librium taper. No evidence of DTs.  Continue home lexapro, trazodone.  Continue folic acid and thiamine. Ordered PT/OT who note significant debility requiring therapy. Pt states he will get alcohol if he wants it wherever he is. States he does not plan to drink alcohol again.

## 2018-07-26 NOTE — PT/OT/SLP PROGRESS
Occupational Therapy   Treatment/ Discharge    Name: Nando Morrison  MRN: [id number]  Admitting Diagnosis:  Healthcare associated bacterial pneumonia       Recommendations:     Discharge Recommendations: nursing facility, basic (w/ Part B therapy)  Discharge Equipment Recommendations:  none  Barriers to discharge:  None    Subjective     Communicated with: Nsg prior to session.  Pain/Comfort:  · Pain Rating 1: 0/10    Patients cultural, spiritual, Yazidism conflicts given the current situation:      Objective:     Patient found with: oxygen, peripheral IV    General Precautions: Standard, fall, respiratory   Orthopedic Precautions:N/A   Braces: N/A     Occupational Performance:    Bed Mobility:    · Patient completed Rolling/Turning to Left with  modified independence and with side rail  · Patient completed Supine to Sit with modified independence and with side rail     Functional Mobility/Transfers:  · Patient completed Sit <> Stand Transfer with supervision  with  no assistive device   · Functional Mobility: Pt CGA with use of RW ~5 ft.     Activities of Daily Living:  · Feeding:  independence seated in bedside chair  · Lower Body Dressing: supervision seated in bedside chair doff/don socks with noted increase in WOB and redness in face.     Patient left up in chair with all lines intact, call button in reach, chair alarm on, nsg notified and  present    [facility] 6 Click:  [facility] Total Score: 18    Treatment & Education:  Pt educated on use of DME to ensure safety in t/f for short and long distances.  Pt educated to monitor rate of work for self care tasks and to practice energy conservation techniques with pacing and rest breaks.   Education:    Assessment:     Nando Morrison is a 63 y.o. male with a medical diagnosis of Healthcare associated bacterial pneumonia.  He presents with deconditioning.  Performance deficits affecting function are weakness, impaired endurance, impaired functional mobilty, impaired self  care skills, impaired balance, decreased lower extremity function, impaired coordination, decreased safety awareness.  Pt to d/c from this date with no further acute OT needs. Pt will benefit from continued OT services at long term NH with Part B therapy. D/C OT.    Rehab Prognosis:  Good; patient would benefit from acute skilled OT services to address these deficits and reach maximum level of function.       Plan:     Patient to be seen 5 x/week to address the above listed problems via self-care/home management, therapeutic activities, therapeutic exercises  · Plan of Care Expires: 08/24/18  · Plan of Care Reviewed with: patient    This Plan of care has been discussed with the patient who was involved in its development and understands and is in agreement with the identified goals and treatment plan    GOALS:    Occupational Therapy Goals        Problem: Occupational Therapy Goal    Goal Priority Disciplines Outcome Interventions   Occupational Therapy Goal     OT, PT/OT Ongoing (interventions implemented as appropriate)    Description:  Goals to be met by: 08/24/18     Patient will increase functional independence with ADLs by performing:    LE Dressing with Supervision.  Grooming while standing at sink with Supervision.  Toileting from toilet with Supervision for hygiene and clothing management.   Toilet transfer to toilet with Supervision.  Increased functional strength to WFL for ADLs.                      Time Tracking:     OT Date of Treatment: 07/26/18  OT Start Time: 1153  OT Stop Time: 1209  OT Total Time (min): 16 min    Billable Minutes:Self Care/Home Management 16    Minda Keys OT  7/26/2018

## 2018-07-26 NOTE — ASSESSMENT & PLAN NOTE
Appears compensated. Held lasix for slightly elevated creatinine. Discontinued at discharge with current EF 55-60% and only grade 1 diastolic dysfunction. On notes from [facility], states that he was taking for lower extremity edema. Pt denies any h/o CHF.

## 2018-07-26 NOTE — PLAN OF CARE
Problem: Physical Therapy Goal  Goal: Physical Therapy Goal  Goals to be met by: 2018     Patient will increase functional independence with mobility by performin. Supine to sit with supervision  2. Sit to supine with supervision   3. Sit to stand transfer with Supervision MET 18  4. Gait  x 80 feet with Supervision using Rolling Walker.   5. Lower extremity exercise program x10 reps with supervision MET 18      Outcome: Ongoing (interventions implemented as appropriate)  Goal 3 met

## 2018-07-26 NOTE — PROGRESS NOTES
Pt being discharged. IV's and telemetry monitor removed from pt. Report called to MAGDALENA Ballard at [facility]. Pt awaiting transportation to facility. Will continue to monitor.

## 2018-07-27 NOTE — PT/OT/SLP DISCHARGE
Physical Therapy Discharge Summary    Name: Nando Morrison  MRN: [id number]   Principal Problem: Healthcare associated bacterial pneumonia     Patient Discharged from acute Physical Therapy on 2018.  Please refer to prior PT noted date on 2018 for functional status.     Assessment:     Patient appropriate for care in another setting.    Objective:     GOALS:    Physical Therapy Goals        Problem: Physical Therapy Goal    Goal Priority Disciplines Outcome Goal Variances Interventions   Physical Therapy Goal     PT/OT, PT Ongoing (interventions implemented as appropriate)     Description:  Goals to be met by: 2018     Patient will increase functional independence with mobility by performin. Supine to sit with supervision  2. Sit to supine with supervision   3. Sit to stand transfer with Supervision MET 18  4. Gait  x 80 feet with Supervision using Rolling Walker.   5. Lower extremity exercise program x10 reps with supervision MET 18                        Reasons for Discontinuation of Therapy Services  Transfer to alternate level of care.      Plan:     Patient Discharged to: Nursing Home with Part B recommended.    Kunal Lewis, PT  2018

## 2018-07-28 LAB
BACTERIA BLD CULT: NORMAL
BACTERIA BLD CULT: NORMAL

## 2020-02-18 NOTE — ED PROVIDER NOTES
"Encounter Date: 7/22/2018       History     Chief Complaint   Patient presents with    Abdominal Pain     x3 days; accompanied by fever and nausea; sent by [facility]- voluntary admittance secondary to depression; last dose of tylenol given a "few hours ago" per EMS     63M was sent from [facility] for fever and abdominal pain.  Pain began 3 days ago.  Today pt was noted to be drowsy and "not himself."  He had fever to 101.6 today so he was sent to the ER.  Pt is sleepind but arousable but does not contribute to history.          Review of patient's allergies indicates:  No Known Allergies  Past Medical History:   Diagnosis Date    Abdominal hernia     Depression     Diverticulitis     History of psychiatric hospitalization     Hx of psychiatric care     Psychiatric problem     Suicide attempt     Therapy      Past Surgical History:   Procedure Laterality Date    COLON SURGERY      STOMACH SURGERY       Family History   Problem Relation Age of Onset    No Known Problems Mother     No Known Problems Father      Social History   Substance Use Topics    Smoking status: Current Every Day Smoker     Packs/day: 2.00     Years: 20.00     Types: Cigarettes    Smokeless tobacco: Never Used    Alcohol use 100.8 oz/week     168 Cans of beer per week      Comment: daily     Review of Systems   Unable to perform ROS: Mental status change       Physical Exam     Initial Vitals   BP Pulse Resp Temp SpO2   07/22/18 2129 07/22/18 2129 07/22/18 2300 07/22/18 2127 07/22/18 2129   95/66 99 (!) 29 99.3 °F (37.4 °C) 95 %      MAP       --                Physical Exam    Nursing note and vitals reviewed.  Constitutional:   Obese male, drowsy, tachypneic   HENT:   Head: Normocephalic and atraumatic.   Mouth/Throat: Oropharynx is clear and moist.   Eyes: Conjunctivae are normal.   Neck: Normal range of motion.   Cardiovascular: Normal rate, regular rhythm and normal heart sounds.   No murmur heard.  Pulmonary/Chest: Tachypnea " noted. He has wheezes.   Abdominal: Soft. Bowel sounds are normal. He exhibits no distension. There is generalized tenderness. There is no rigidity, no rebound and no guarding.   Musculoskeletal: Normal range of motion.   Neurological: He has normal strength.   Skin: Skin is warm and dry.   Psychiatric:   Drowsy; answers some questions         ED Course   Procedures  Labs Reviewed   CBC W/ AUTO DIFFERENTIAL - Abnormal; Notable for the following:        Result Value    RBC 4.34 (*)      (*)     MCH 33.2 (*)     Mono # 1.4 (*)     Eos # 0.6 (*)     Mono% 24.6 (*)     Eosinophil% 11.2 (*)     All other components within normal limits   COMPREHENSIVE METABOLIC PANEL - Abnormal; Notable for the following:     Albumin 2.8 (*)     AST 51 (*)     All other components within normal limits   URINALYSIS, REFLEX TO URINE CULTURE    Narrative:     Preferred Collection Type->Urine, Clean Catch   LIPASE   HEMOGLOBIN A1C          Imaging Results          CT Abdomen Pelvis With Contrast (Final result)  Result time 07/23/18 00:30:58    Final result by Grover Galan MD (07/23/18 00:30:58)                 Impression:      1. Colonic diverticulosis with postsurgical changes of partial sigmoid resection.  There is mild stranding seen in the left lower quadrant.  Prior CT abdomen and pelvis are unavailable for review on PACS to determine if this may reflect postsurgical change versus acute inflammation.  Correlate for localizing left lower quadrant pain or symptoms of acute diverticulitis.  2. Mild patchy opacity/consolidative change seen within the superior segment of the left lower lobe which may reflect aspiration or developing pneumonia.  Possible underlying chronic interstitial lung disease.  3. Hepatomegaly with hepatic steatosis.  4. Additional findings as detailed above.      Electronically signed by: Grover Galan MD  Date:    07/23/2018  Time:    00:30             Narrative:    EXAMINATION:  CT ABDOMEN PELVIS WITH  CONTRAST    CLINICAL HISTORY:  abd pain and fever;    TECHNIQUE:  Low dose axial images, sagittal and coronal reformations were obtained from the lung bases to the pubic symphysis following the IV administration of 100 mL of Omnipaque 350 and the oral administration of 30 mL of Omnipaque 350.    COMPARISON:  Previous CT abdomen and pelvis from March 2015 is unavailable for review on PACS.    FINDINGS:  The visualized portion of the heart is unremarkable.  Mild focal opacities and consolidative changes seen within the superior segment of the left lower lobe.  Mild ground-glass attenuation with peripheral reticulations are also seen within the lungs which may suggest underlying chronic interstitial lung disease.  No pleural effusion.    Liver is enlarged measuring 20 cm and diffusely hypoattenuating in appearance consistent with diffuse fatty infiltration.  There is no intra-or extrahepatic biliary ductal dilatation.  The gallbladder is unremarkable.  The stomach, pancreas, spleen, and adrenal glands are unremarkable.    Kidneys are functioning.  No evidence of hydronephrosis.  No abnormalities seen along the ureteral courses.  Urinary bladder is nondistended.  Prostate is unremarkable.    Appendix is visualized and is unremarkable.  Postsurgical changes are visualized consistent with partial sigmoid resection.  Mild adjacent stranding is seen within the left lower quadrant.  There is colonic diverticulosis also seen within the region.  Mild increased intramural fat is seen within the cecum.  No evidence of abnormal small bowel dilatation or obstruction.  No free air or free fluid.    Aorta tapers normally with moderate atherosclerosis.    No acute osseous abnormality identified. Subcutaneous soft tissue structures are unremarkable.                               X-Ray Chest 1 View (Final result)  Result time 07/22/18 22:54:57    Final result by Grover Galan MD (07/22/18 22:54:57)                 Impression:       Coarse interstitial attenuation, nonspecific but may reflect chronic change versus mild interstitial edema.      Electronically signed by: Grover Galan MD  Date:    07/22/2018  Time:    22:54             Narrative:    EXAMINATION:  XR CHEST 1 VIEW    CLINICAL HISTORY:  wheezing; fever;    TECHNIQUE:  Single frontal view of the chest was performed.    COMPARISON:  None    FINDINGS:  Cardiac silhouette is normal in size.  Lungs are symmetrically expanded.  Coarse interstitial lung changes are seen.  No evidence of focal consolidative process, pneumothorax, or significant effusion.  No acute osseous abnormality identified.                                 Medical Decision Making:   Clinical Tests:   Lab Tests: Ordered and Reviewed  Radiological Study: Ordered and Reviewed  ED Management:  Fever with abd pain and drowsiness/ MS change.  No fever in ER.  Tachypneic.  Work up shows possible pneumonia - aspiration vs HCAP.  BCx sent.  Start vanc and zosyn.  Admit to [facility].    Other:   I have discussed this case with another health care provider.                      Clinical Impression:   The primary encounter diagnosis was Pneumonia due to infectious organism, unspecified laterality, unspecified part of lung. A diagnosis of Acute febrile illness was also pertinent to this visit.                             Paulina Gan MD  07/23/18 0126     Cyclophosphamide Counseling:  I discussed with the patient the risks of cyclophosphamide including but not limited to hair loss, hormonal abnormalities, decreased fertility, abdominal pain, diarrhea, nausea and vomiting, bone marrow suppression and infection. The patient understands that monitoring is required while taking this medication.

## 2020-08-15 ENCOUNTER — HOSPITAL ENCOUNTER (EMERGENCY)
Facility: HOSPITAL | Age: 65
Discharge: HOME | End: 2020-08-15
Attending: EMERGENCY MEDICINE
Payer: MEDICARE

## 2020-08-15 VITALS
TEMPERATURE: 98 F | HEART RATE: 80 BPM | SYSTOLIC BLOOD PRESSURE: 98 MMHG | RESPIRATION RATE: 22 BRPM | OXYGEN SATURATION: 94 % | WEIGHT: 180 LBS | HEIGHT: 63 IN | DIASTOLIC BLOOD PRESSURE: 55 MMHG | BODY MASS INDEX: 31.89 KG/M2

## 2020-08-15 DIAGNOSIS — F10.920 ALCOHOLIC INTOXICATION WITHOUT COMPLICATION: Primary | ICD-10-CM

## 2020-08-15 LAB
ALBUMIN SERPL BCP-MCNC: 3.3 G/DL (ref 3.5–5.2)
ALP SERPL-CCNC: 122 U/L (ref 55–135)
ALT SERPL W/O P-5'-P-CCNC: 18 U/L (ref 10–44)
AMPHET+METHAMPHET UR QL: NEGATIVE
ANION GAP SERPL CALC-SCNC: 12 MMOL/L (ref 8–16)
APAP SERPL-MCNC: <3 UG/ML (ref 10–20)
AST SERPL-CCNC: 36 U/L (ref 10–40)
BARBITURATES UR QL SCN>200 NG/ML: NEGATIVE
BASOPHILS # BLD AUTO: 0.04 K/UL (ref 0–0.2)
BASOPHILS NFR BLD: 0.5 % (ref 0–1.9)
BENZODIAZ UR QL SCN>200 NG/ML: NORMAL
BILIRUB SERPL-MCNC: 0.3 MG/DL (ref 0.1–1)
BILIRUB UR QL STRIP: NEGATIVE
BUN SERPL-MCNC: 6 MG/DL (ref 8–23)
BZE UR QL SCN: NEGATIVE
CALCIUM SERPL-MCNC: 9.1 MG/DL (ref 8.7–10.5)
CANNABINOIDS UR QL SCN: NEGATIVE
CHLORIDE SERPL-SCNC: 102 MMOL/L (ref 95–110)
CLARITY UR: CLEAR
CO2 SERPL-SCNC: 26 MMOL/L (ref 23–29)
COLOR UR: NORMAL
CREAT SERPL-MCNC: 1 MG/DL (ref 0.5–1.4)
CREAT UR-MCNC: 34.4 MG/DL (ref 23–375)
DIFFERENTIAL METHOD: ABNORMAL
EOSINOPHIL # BLD AUTO: 0.5 K/UL (ref 0–0.5)
EOSINOPHIL NFR BLD: 6.9 % (ref 0–8)
ERYTHROCYTE [DISTWIDTH] IN BLOOD BY AUTOMATED COUNT: 14.3 % (ref 11.5–14.5)
EST. GFR  (AFRICAN AMERICAN): >60 ML/MIN/1.73 M^2
EST. GFR  (NON AFRICAN AMERICAN): >60 ML/MIN/1.73 M^2
ETHANOL SERPL-MCNC: 205 MG/DL
GLUCOSE SERPL-MCNC: 79 MG/DL (ref 70–110)
GLUCOSE UR QL STRIP: NEGATIVE
HCT VFR BLD AUTO: 44 % (ref 40–54)
HGB BLD-MCNC: 14.5 G/DL (ref 14–18)
HGB UR QL STRIP: NEGATIVE
IMM GRANULOCYTES # BLD AUTO: 0.05 K/UL (ref 0–0.04)
IMM GRANULOCYTES NFR BLD AUTO: 0.7 % (ref 0–0.5)
KETONES UR QL STRIP: NEGATIVE
LEUKOCYTE ESTERASE UR QL STRIP: NEGATIVE
LIPASE SERPL-CCNC: 8 U/L (ref 4–60)
LYMPHOCYTES # BLD AUTO: 2.3 K/UL (ref 1–4.8)
LYMPHOCYTES NFR BLD: 29.9 % (ref 18–48)
MAGNESIUM SERPL-MCNC: 1.8 MG/DL (ref 1.6–2.6)
MCH RBC QN AUTO: 34.4 PG (ref 27–31)
MCHC RBC AUTO-ENTMCNC: 33 G/DL (ref 32–36)
MCV RBC AUTO: 104 FL (ref 82–98)
METHADONE UR QL SCN>300 NG/ML: NEGATIVE
MONOCYTES # BLD AUTO: 0.6 K/UL (ref 0.3–1)
MONOCYTES NFR BLD: 7.4 % (ref 4–15)
NEUTROPHILS # BLD AUTO: 4.1 K/UL (ref 1.8–7.7)
NEUTROPHILS NFR BLD: 54.6 % (ref 38–73)
NITRITE UR QL STRIP: NEGATIVE
NRBC BLD-RTO: 0 /100 WBC
OPIATES UR QL SCN: NEGATIVE
PCP UR QL SCN>25 NG/ML: NEGATIVE
PH UR STRIP: 6 [PH] (ref 5–8)
PLATELET # BLD AUTO: 270 K/UL (ref 150–350)
PMV BLD AUTO: 8.9 FL (ref 9.2–12.9)
POTASSIUM SERPL-SCNC: 4 MMOL/L (ref 3.5–5.1)
PROT SERPL-MCNC: 7.2 G/DL (ref 6–8.4)
PROT UR QL STRIP: NEGATIVE
RBC # BLD AUTO: 4.22 M/UL (ref 4.6–6.2)
SALICYLATES SERPL-MCNC: <5 MG/DL (ref 15–30)
SODIUM SERPL-SCNC: 140 MMOL/L (ref 136–145)
SP GR UR STRIP: 1 (ref 1–1.03)
TOXICOLOGY INFORMATION: NORMAL
URN SPEC COLLECT METH UR: NORMAL
UROBILINOGEN UR STRIP-ACNC: NEGATIVE EU/DL
WBC # BLD AUTO: 7.55 K/UL (ref 3.9–12.7)

## 2020-08-15 PROCEDURE — 85025 COMPLETE CBC W/AUTO DIFF WBC: CPT

## 2020-08-15 PROCEDURE — 83735 ASSAY OF MAGNESIUM: CPT

## 2020-08-15 PROCEDURE — 80307 DRUG TEST PRSMV CHEM ANLYZR: CPT

## 2020-08-15 PROCEDURE — 83690 ASSAY OF LIPASE: CPT

## 2020-08-15 PROCEDURE — 99284 EMERGENCY DEPT VISIT MOD MDM: CPT | Mod: 25

## 2020-08-15 PROCEDURE — 25000003 PHARM REV CODE 250: Performed by: EMERGENCY MEDICINE

## 2020-08-15 PROCEDURE — 80053 COMPREHEN METABOLIC PANEL: CPT

## 2020-08-15 PROCEDURE — 80329 ANALGESICS NON-OPIOID 1 OR 2: CPT

## 2020-08-15 PROCEDURE — 81003 URINALYSIS AUTO W/O SCOPE: CPT

## 2020-08-15 PROCEDURE — 96360 HYDRATION IV INFUSION INIT: CPT

## 2020-08-15 PROCEDURE — 80320 DRUG SCREEN QUANTALCOHOLS: CPT

## 2020-08-15 RX ADMIN — SODIUM CHLORIDE 1000 ML: 0.9 INJECTION, SOLUTION INTRAVENOUS at 05:08

## 2020-08-15 NOTE — ED TRIAGE NOTES
"Patient presents via EMS with alcohol intoxication from [facility]. EMS reports pt has been violent with staff and other residents today. Pt states he has been drinking vodka today "straight up". Patient is only oriented to person and place. Complaining of abdominal pain, n/d, and head pain that feels like "someone is hammering him in the head." Placed on cardiac monitoring and continuous pulse ox. All vitals stable at this time. Safety sitter at bedside.  "

## 2020-08-15 NOTE — ED PROVIDER NOTES
Encounter Date: 8/15/2020       History     Chief Complaint   Patient presents with    Alcohol Intoxication     pt comes in via EMS from [facility] with c/o from staff for patient intoxication and agression towards staff.      HPI   This 65-year-old male presents to the emergency room, sent here because he has been drinking alcohol and has been aggressive and profane with the nursing staff.  The patient reports that he has abdominal pain but it is chronic abdominal pain not worse or different than usual.  He calls a diverticulitis .  The patient also has a ventral hernia which is chronic.  He denies vomiting.  He occasionally has problems with diarrhea, not today.  He is otherwise well pain  Review of patient's allergies indicates:  No Known Allergies  Past Medical History:   Diagnosis Date    Abdominal hernia     Depression     Diverticulitis     History of psychiatric hospitalization     Hx of psychiatric care     Psychiatric problem     Suicide attempt     Therapy      Past Surgical History:   Procedure Laterality Date    COLON SURGERY      STOMACH SURGERY       Family History   Problem Relation Age of Onset    No Known Problems Mother     No Known Problems Father      Social History     Tobacco Use    Smoking status: Current Every Day Smoker     Packs/day: 2.00     Years: 20.00     Pack years: 40.00     Types: Cigarettes    Smokeless tobacco: Never Used   Substance Use Topics    Alcohol use: Yes     Alcohol/week: 168.0 standard drinks     Types: 168 Cans of beer per week     Comment: daily    Drug use: No     Review of Systems  The patient was questioned specifically with regard to the following.  General: Fever, chills, sweats. Neuro: Headache. Eyes: eye problems. ENT: Ear pain, sore throat. Cardiovascular: Chest pain. Respiratory: Cough, shortness of breath. Gastrointestinal: Abdominal pain, vomiting, diarrhea. Genitourinary: Painful urination.  Musculoskeletal: Arm and leg  problems. Skin: Rash.  The review of systems was negative except for the following:  Abdominal pain diarrhea  Physical Exam     Initial Vitals [08/15/20 1645]   BP Pulse Resp Temp SpO2   (!) 98/56 80 18 98 °F (36.7 °C) 96 %      MAP       --         Physical Exam  The patient was examined specifically for the following:   General:No significant distress, Good color, Warm and dry. Head and neck:Scalp atraumatic, Neck supple. Neurological:Appropriate conversation, Gross motor deficits. Eyes:Conjugate gaze, Clear corneas. ENT: No epistaxis. Cardiac: Regular rate and rhythm, Grossly normal heart tones. Pulmonary: Wheezing, Rales. Gastrointestinal: Abdominal tenderness, Abdominal distention. Musculoskeletal: Extremity deformity, Apparent pain with range of motion of the joints. Skin: Rash.   The findings on examination were normal except for the following:  Patient's blood pressure is 98/56.  The lungs are clear.  The heart tones are normal.  There is a ventral abdominal hernia.  There is vague diffuse left-sided abdominal tenderness.  There is no guarding rebound mass or distention.  Patient has slightly slurred speech.  Cranial nerves motor and sensory examination are normal.  He is appropriate conversation.  ED Course   Procedures  Labs Reviewed   COMPREHENSIVE METABOLIC PANEL - Abnormal; Notable for the following components:       Result Value    BUN, Bld 6 (*)     Albumin 3.3 (*)     All other components within normal limits   CBC W/ AUTO DIFFERENTIAL - Abnormal; Notable for the following components:    RBC 4.22 (*)     Mean Corpuscular Volume 104 (*)     Mean Corpuscular Hemoglobin 34.4 (*)     MPV 8.9 (*)     Immature Granulocytes 0.7 (*)     Immature Grans (Abs) 0.05 (*)     All other components within normal limits   ALCOHOL,MEDICAL (ETHANOL) - Abnormal; Notable for the following components:    Alcohol, Medical, Serum 205 (*)     All other components within normal limits    Narrative:     Recoll. [id number] by  B2 at 08/15/2020 19:20, reason: specimen   hemolyzed   SALICYLATE LEVEL - Abnormal; Notable for the following components:    Salicylate Lvl <5.0 (*)     All other components within normal limits    Narrative:     Recoll. [id number] by [facility] at 08/15/2020 19:20, reason: specimen   hemolyzed   ACETAMINOPHEN LEVEL - Abnormal; Notable for the following components:    Acetaminophen (Tylenol), Serum <3.0 (*)     All other components within normal limits    Narrative:     Recoll. [id number] by [facility] at 08/15/2020 19:20, reason: specimen   hemolyzed   LIPASE   MAGNESIUM   URINALYSIS, REFLEX TO URINE CULTURE    Narrative:     Specimen Source->Urine   DRUG SCREEN PANEL, URINE EMERGENCY    Narrative:     Specimen Source->Urine          Imaging Results    None          Medical Decision Making:   Initial Assessment:   65-year-old male who was signed out to me on shift change about 6:00 p.m..  Per report patient has chronic complaints and only acute complaint is alcohol intoxication.  I went in to evaluate patient is resting comfortably.  Patient has no complaints.  States he just drink alcohol.  Patient states he has no new pain.  Denies any chest pain shortness breath nausea vomiting.  Tolerating p.o..  Alcohol level was elevated in the 200s.  Patient was observed for multiple hours in the emergency department with clinical improvement.  Was given a L of fluids.  Ambulatory without issues and tolerating p.o..  Patient states he feels much better and feels comfortable enough to be discharged.  He is alert and oriented with decision-making capacity.  Discharging patient.  He has tolerated p.o.. I discussed with the patient/family the diagnosis, treatment plan, indications for return to the emergency department, and for expected follow-up. The patient/family verbalized an understanding. The patient/family is asked if there are any questions or concerns. We discuss the case, until all issues are addressed to the patient/familys  satisfaction. Patient/family understands and is agreeable to the plan.  No signs of trauma.  Italo Mejía      Clinical Tests:   Lab Tests: Ordered and Reviewed                                 Clinical Impression:       ICD-10-CM ICD-9-CM   1. Alcoholic intoxication without complication  F10.920 305.00             ED Disposition Condition    Discharge Stable        ED Prescriptions     None        Follow-up Information     Follow up With Specialties Details Why Contact Info    [facility]  Schedule an appointment as soon as possible for a visit in 2 days  [address] [place] [address]  [phone]                                       Italo Mejía MD  08/15/20 2506